# Patient Record
Sex: MALE | Race: WHITE | NOT HISPANIC OR LATINO | Employment: FULL TIME | URBAN - METROPOLITAN AREA
[De-identification: names, ages, dates, MRNs, and addresses within clinical notes are randomized per-mention and may not be internally consistent; named-entity substitution may affect disease eponyms.]

---

## 2021-12-08 ENCOUNTER — TELEPHONE (OUTPATIENT)
Dept: SURGERY | Facility: CLINIC | Age: 41
End: 2021-12-08

## 2021-12-08 NOTE — TELEPHONE ENCOUNTER
Called patient regarding appointment for 12/21/21 no answer. Appointment details left on VM with message to call back.

## 2021-12-09 ENCOUNTER — OFFICE VISIT (OUTPATIENT)
Dept: FAMILY MEDICINE CLINIC | Facility: CLINIC | Age: 41
End: 2021-12-09

## 2021-12-09 VITALS — WEIGHT: 189 LBS | HEIGHT: 70 IN | BODY MASS INDEX: 27.06 KG/M2

## 2021-12-09 DIAGNOSIS — S29.9XXA INJURY OF CHEST WALL, INITIAL ENCOUNTER: Primary | ICD-10-CM

## 2021-12-09 DIAGNOSIS — T14.90XA INJURY OF CARTILAGE: ICD-10-CM

## 2021-12-09 PROBLEM — E78.5 HYPERLIPIDEMIA: Status: ACTIVE | Noted: 2020-03-02

## 2021-12-09 PROBLEM — Z71.89 CARDIAC RISK COUNSELING: Status: ACTIVE | Noted: 2020-03-02

## 2021-12-09 PROBLEM — I10 BENIGN ESSENTIAL HYPERTENSION: Status: ACTIVE | Noted: 2020-03-02

## 2021-12-09 PROCEDURE — 99203 OFFICE O/P NEW LOW 30 MIN: CPT | Performed by: NURSE PRACTITIONER

## 2021-12-09 RX ORDER — HYDROCODONE BITARTRATE AND ACETAMINOPHEN 7.5; 325 MG/1; MG/1
1 TABLET ORAL EVERY 4 HOURS PRN
Qty: 46 TABLET | Refills: 0 | Status: SHIPPED | OUTPATIENT
Start: 2021-12-09 | End: 2021-12-21 | Stop reason: SDUPTHER

## 2021-12-09 RX ORDER — HYDROXYZINE HYDROCHLORIDE 10 MG/1
TABLET, FILM COATED ORAL
COMMUNITY
Start: 2021-10-04 | End: 2022-01-24

## 2021-12-09 RX ORDER — FINASTERIDE 1 MG/1
TABLET, FILM COATED ORAL
COMMUNITY
Start: 2021-10-25 | End: 2022-01-24

## 2021-12-14 ENCOUNTER — TELEPHONE (OUTPATIENT)
Dept: SURGERY | Facility: CLINIC | Age: 41
End: 2021-12-14

## 2021-12-14 NOTE — TELEPHONE ENCOUNTER
Called patient to confirm appointment for 12/21/21 no answer left message on vm with appointment time and details as well as requesting the patient to bring imaging to the appointment as well. Instructed to call [phone] with questions

## 2021-12-16 LAB — DRUGS UR: NORMAL

## 2021-12-21 ENCOUNTER — HOSPITAL ENCOUNTER (OUTPATIENT)
Dept: GENERAL RADIOLOGY | Facility: HOSPITAL | Age: 41
Discharge: HOME | End: 2021-12-21
Admitting: THORACIC SURGERY (CARDIOTHORACIC VASCULAR SURGERY)

## 2021-12-21 ENCOUNTER — PATIENT ROUNDING (BHMG ONLY) (OUTPATIENT)
Dept: SURGERY | Facility: CLINIC | Age: 41
End: 2021-12-21

## 2021-12-21 ENCOUNTER — OFFICE VISIT (OUTPATIENT)
Dept: SURGERY | Facility: CLINIC | Age: 41
End: 2021-12-21

## 2021-12-21 VITALS
OXYGEN SATURATION: 98 % | WEIGHT: 185 LBS | BODY MASS INDEX: 26.48 KG/M2 | SYSTOLIC BLOOD PRESSURE: 112 MMHG | DIASTOLIC BLOOD PRESSURE: 82 MMHG | HEART RATE: 70 BPM | HEIGHT: 70 IN

## 2021-12-21 DIAGNOSIS — T14.90XA INJURY OF CARTILAGE: ICD-10-CM

## 2021-12-21 DIAGNOSIS — S29.9XXA INJURY OF CHEST WALL, INITIAL ENCOUNTER: ICD-10-CM

## 2021-12-21 DIAGNOSIS — S29.9XXA CHEST WALL INJURY, INITIAL ENCOUNTER: ICD-10-CM

## 2021-12-21 DIAGNOSIS — R07.89 OTHER CHEST PAIN: Primary | ICD-10-CM

## 2021-12-21 DIAGNOSIS — S29.9XXA CHEST WALL INJURY, INITIAL ENCOUNTER: Primary | ICD-10-CM

## 2021-12-21 PROCEDURE — 71046 X-RAY EXAM CHEST 2 VIEWS: CPT

## 2021-12-21 PROCEDURE — 99202 OFFICE O/P NEW SF 15 MIN: CPT | Performed by: THORACIC SURGERY (CARDIOTHORACIC VASCULAR SURGERY)

## 2021-12-21 RX ORDER — CELECOXIB 100 MG/1
100 CAPSULE ORAL 2 TIMES DAILY
Qty: 60 CAPSULE | Refills: 0 | Status: SHIPPED | OUTPATIENT
Start: 2021-12-21 | End: 2022-01-20

## 2021-12-21 RX ORDER — GABAPENTIN 300 MG/1
300 CAPSULE ORAL 3 TIMES DAILY
Qty: 90 CAPSULE | Refills: 0 | Status: SHIPPED | OUTPATIENT
Start: 2021-12-21 | End: 2022-07-21

## 2021-12-21 RX ORDER — HYDROCODONE BITARTRATE AND ACETAMINOPHEN 7.5; 325 MG/1; MG/1
1 TABLET ORAL EVERY 4 HOURS PRN
Qty: 46 TABLET | Refills: 0 | Status: SHIPPED | OUTPATIENT
Start: 2021-12-21 | End: 2022-01-24

## 2021-12-21 NOTE — PROGRESS NOTES
"Chief Complaint  Injury to the chest wall with pain    Subjective          Herminio Schneider presents to [facility] THORACIC SURGERY  History of Present Illness     Mr. Schneider is a 41-year-old male .  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  While trying to break a hold he felt a pop in his chest near the junction of the left costal margin in sternum.  He stopped for a few minutes and felt better and then proceeded on with his training.  Unfortunately during the ensuing maneuvers the pain became even more severe and he could feel movement in his left chest.  Since then he has had severe pain.  He treated himself with Advil and a body brace.  This did not help his symptoms.  He was seen by immediate care and a chest x-ray was obtained which was within normal limits.  He has been referred here for further evaluation.    He smoked briefly for about 4 years and has not smoked since age 18.  He is on an inhaler for asthma.  He has hypertension.  He has no other medical problems    Objective   Vital Signs:   /82 (BP Location: Right arm, Patient Position: Sitting, Cuff Size: Adult)   Pulse 70   Ht 177.8 cm (70\")   Wt 83.9 kg (185 lb)   SpO2 98%   BMI 26.54 kg/m²     Physical Exam     General: Patient is clearly uncomfortable in pain and very guarding of his movements.  He is most comfortable when lying on his back with his left arm up over his head.    Neck: No cervical or supraclavicular adenopathy.  Full range of motion without pain.    Chest: Sternum is stable.  Very tender just left of the xiphoid near the costal margin.  Costal margin is stable.  No deformity and no step-off.  I cannot feel any movement in the chest wall with coughing or movement of his left arm.    Pulmonary: Lungs are clear to auscultation with equal breath sounds bilaterally    Cardiac: Regular rate and rhythm.  No murmur or gallop.  No dependent edema.    Result Review :   The following data was " reviewed by: Marcus Steen III, MD on 12/21/2021:    Chest x-ray performed today was independently reviewed.  Both lungs are fully expanded.  No pneumothorax.  No pleural effusion.  A few scattered calcified granulomas.  Heart size is within normal limits.  No obvious chest wall abnormality.         Assessment and Plan    Diagnoses and all orders for this visit:    1. Other chest pain (Primary)  -     CT Chest Without Contrast; Future  -     gabapentin (NEURONTIN) 300 MG capsule; Take 1 capsule by mouth 3 (Three) Times a Day for 30 days.  Dispense: 90 capsule; Refill: 0    2. Injury of chest wall, initial encounter  -     HYDROcodone-acetaminophen (Norco) 7.5-325 MG per tablet; Take 1 tablet by mouth Every 4 (Four) Hours As Needed for Moderate Pain .  Dispense: 46 tablet; Refill: 0    3. Injury of cartilage  -     HYDROcodone-acetaminophen (Norco) 7.5-325 MG per tablet; Take 1 tablet by mouth Every 4 (Four) Hours As Needed for Moderate Pain .  Dispense: 46 tablet; Refill: 0    Other orders  -     celecoxib (CeleBREX) 100 MG capsule; Take 1 capsule by mouth 2 (Two) Times a Day for 30 days.  Dispense: 60 capsule; Refill: 0      I believe that this gentleman dislocated costal cartilage along the left side of the sternum and possibly the xiphoid.  He is in quite a bit of pain.  I have ordered a CT of the chest wall with 3D reconstruction to get a better idea of the extent of his injury.  I have reordered his Lortab to help with pain control.  I have also started him on our rib fracture protocol with gabapentin and Celebrex.  Have advised him to rest and to use heat alternating with ice on the area.  He will return to the office in 3 weeks for further evaluation.  If no better we will try pain management for trigger point injections.    I spent 22 minutes caring for Herminio on this date of service. This time includes time spent by me in the following activities:preparing for the visit, reviewing tests, performing a  medically appropriate examination and/or evaluation , counseling and educating the patient/family/caregiver, ordering medications, tests, or procedures, referring and communicating with other health care professionals , documenting information in the medical record, independently interpreting results and communicating that information with the patient/family/caregiver and care coordination  Follow Up   Return in about 3 weeks (around 1/11/2022) for Recheck.  Patient was given instructions and counseling regarding his condition or for health maintenance advice. Please see specific information pulled into the AVS if appropriate.

## 2021-12-21 NOTE — PROGRESS NOTES
December 21, 2021    osmin Redd I speak with Herminio Schneider?        Done in office    Before we get started may I verify your date of birth? 1980       Tell me about your visit with us. What things went well?  Everything went good. Everything was explained well and in detail.        We're always looking for ways to make our patients' experiences even better. Do you have recommendations on ways we may improve?  no    Overall were you satisfied with your first visit to our practice? yes       I appreciate you taking the time to speak with me today. Is there anything else I can do for you? no    Thank you, and have a great day.

## 2021-12-27 ENCOUNTER — APPOINTMENT (OUTPATIENT)
Dept: CT IMAGING | Facility: HOSPITAL | Age: 41
End: 2021-12-27

## 2021-12-30 ENCOUNTER — HOSPITAL ENCOUNTER (OUTPATIENT)
Dept: CT IMAGING | Facility: HOSPITAL | Age: 41
Discharge: HOME | End: 2021-12-30
Admitting: THORACIC SURGERY (CARDIOTHORACIC VASCULAR SURGERY)

## 2021-12-30 DIAGNOSIS — R07.89 OTHER CHEST PAIN: ICD-10-CM

## 2021-12-30 PROCEDURE — 71250 CT THORAX DX C-: CPT

## 2022-01-11 ENCOUNTER — OFFICE VISIT (OUTPATIENT)
Dept: SURGERY | Facility: CLINIC | Age: 42
End: 2022-01-11

## 2022-01-11 VITALS
DIASTOLIC BLOOD PRESSURE: 82 MMHG | SYSTOLIC BLOOD PRESSURE: 130 MMHG | HEART RATE: 70 BPM | BODY MASS INDEX: 26.48 KG/M2 | WEIGHT: 185 LBS | OXYGEN SATURATION: 99 % | HEIGHT: 70 IN

## 2022-01-11 DIAGNOSIS — T14.90XA INJURY OF CARTILAGE: ICD-10-CM

## 2022-01-11 DIAGNOSIS — S29.9XXA CHEST WALL INJURY, INITIAL ENCOUNTER: Primary | ICD-10-CM

## 2022-01-11 PROCEDURE — 99213 OFFICE O/P EST LOW 20 MIN: CPT | Performed by: THORACIC SURGERY (CARDIOTHORACIC VASCULAR SURGERY)

## 2022-01-11 NOTE — PROGRESS NOTES
"Chief Complaint  Follow-up (3 wk ct follow up)    Subjective          Herminio Schneider presents to [facility] THORACIC SURGERY  History of Present Illness     Mr. Jaquez was seen in the office today January 11, 2022 for further follow-up of chest wall pain.  He has been taking gabapentin and Celebrex since his initial evaluation a month ago.  He has been resting and not working out.  He feels as though his pain is improved.  He believes the gabapentin makes the biggest difference.  He still has pain but is starting to do some stretching exercises and seems to be tolerating this well.  He has had a CT of the chest with 3D reconstruction of the chest wall for further evaluation.  He is here today to discuss the CT scan and further treatment.    Objective   Vital Signs:   /82 (BP Location: Right arm, Patient Position: Sitting, Cuff Size: Adult)   Pulse 70   Ht 177.8 cm (70\")   Wt 83.9 kg (185 lb)   SpO2 99%   BMI 26.54 kg/m²     Physical Exam     General: Patient is much more comfortable.  No guarding.    Neck: No cervical or supraclavicular adenopathy.  Full range of motion without pain.    Chest: Sternum is stable.  Marked decreased tenderness along the left xiphoid and costal margin.  No deformity no step-off.  No instability.    Pulmonary: Lungs are clear to auscultation.    Cardiac: Regular rate and rhythm.  No murmurs or gallops.  No dependent edema.    Result Review :   The following data was reviewed by: Marcus Steen III, MD on 01/11/2022:    CT scan of the chest performed December 30, 2021 was independently reviewed.  I see no evidence for fracture or dislocation of the xiphoid.  There is a nondisplaced fracture of the left sixth costal cartilage.  There is a displaced fracture of the left seventh costal cartilage.  Remainder of the ribs and costal cartilages are intact.  No pulmonary infiltrates.  There is a sub-6 mm nodule in the right upper lobe.  No suspicious hilar or " mediastinal adenopathy.         Assessment and Plan    Diagnoses and all orders for this visit:    1. Chest wall injury, initial encounter (Primary)  -     Ambulatory Referral to Physical Therapy Evaluate and treat    2. Injury of cartilage  -     Ambulatory Referral to Physical Therapy Evaluate and treat      Patient is improving with medication and rest.  I believe he is improved enough at this point that he may benefit from physical therapy.  We will refer him to sports medicine for physical therapy since he works as a  and is so active in his working out.  We will do the physical therapy for 6 weeks and then reevaluate for possible return to work.  We will also continue the gabapentin and Celebrex.  Will not need other medications at this time.    I spent 23 minutes caring for Herminio on this date of service. This time includes time spent by me in the following activities:preparing for the visit, reviewing tests, performing a medically appropriate examination and/or evaluation , counseling and educating the patient/family/caregiver, ordering medications, tests, or procedures, referring and communicating with other health care professionals , documenting information in the medical record, independently interpreting results and communicating that information with the patient/family/caregiver and care coordination  Follow Up   Return in about 6 weeks (around 2/22/2022) for Recheck.  Patient was given instructions and counseling regarding his condition or for health maintenance advice. Please see specific information pulled into the AVS if appropriate.

## 2022-01-13 ENCOUNTER — TREATMENT (OUTPATIENT)
Dept: PHYSICAL THERAPY | Facility: CLINIC | Age: 42
End: 2022-01-13

## 2022-01-13 DIAGNOSIS — T14.90XA INJURY OF CARTILAGE: ICD-10-CM

## 2022-01-13 DIAGNOSIS — S29.9XXA INJURY OF CHEST WALL, INITIAL ENCOUNTER: Primary | ICD-10-CM

## 2022-01-13 DIAGNOSIS — R68.89 DECREASED FUNCTIONAL ACTIVITY TOLERANCE: ICD-10-CM

## 2022-01-13 DIAGNOSIS — R07.89 COSTOCHONDRAL CHEST PAIN: ICD-10-CM

## 2022-01-13 PROCEDURE — 97110 THERAPEUTIC EXERCISES: CPT | Performed by: PHYSICAL THERAPIST

## 2022-01-13 PROCEDURE — 97162 PT EVAL MOD COMPLEX 30 MIN: CPT | Performed by: PHYSICAL THERAPIST

## 2022-01-13 NOTE — PROGRESS NOTES
Physical Therapy Initial Evaluation and Plan of Care    Patient: Herminio Schneider   : 1980  Diagnosis/ICD-10 Code:  Injury of chest wall, initial encounter [S29.9XXA]  Referring practitioner: Marcus Steen III, MD    Subjective Evaluation    History of Present Illness  Mechanism of injury: Pt reports on Dec 7 2021 he was in [employer] class and and felt a pop in chest wall   CT done on 21  IMPRESSION:  1.  Displaced fracture of the left seventh costochondral cartilage by  approximately one shaft width.  2.  Findings of mild pneumonia within the right lower lobe. The  tree-in-bud appearance is suggestive of a primary endobronchial based  pneumonia with differential considerations including but not limited to  aspiration versus atypical mycobacterium. Correlation with patient  history is recommended to establish the most appropriate etiology.  3.  Sub-6 mm pulmonary nodule right upper lobe. Follow-up with chest CT  in 12 months be considered to ensure stability.  4.  Other findings as above.      Patient Occupation:   Pain  Current pain ratin  At best pain ratin  At worst pain ratin  Location: L chest wall   Quality: tight, pulling and sharp  Relieving factors: medications  Aggravating factors: lifting, prolonged positioning, sleeping and outstretched reach (weight bearing )  Progression: improved    Hand dominance: left    Treatments  Previous treatment: medication  Current treatment: physical therapy  Patient Goals  Patient goals for therapy: decreased pain, increased motion, increased strength and return to sport/leisure activities             Objective          Palpation   Left   Tenderness of the intercostals.     Active Range of Motion   Left Shoulder   Normal active range of motion    Right Shoulder   Normal active range of motion    Strength/Myotome Testing     Left Shoulder     Planes of Motion   Flexion: 5   Abduction: 5   External rotation at 0°: 5   Internal rotation at  0°: 5     Isolated Muscles   Pectoralis minor: 4   Rhomboids: 4-   Serratus anterior: 4-   Supraspinatus: 4     Right Shoulder     Planes of Motion   Flexion: 5   Abduction: 5   External rotation at 0°: 5   Internal rotation at 0°: 5     Isolated Muscles   Pectoralis minor: 4+   Rhomboids: 4-   Serratus anterior: 4   Supraspinatus: 4+     Functional Assessment     Comments  Quick dash: 27.27          Assessment & Plan     Assessment  Impairments: abnormal or restricted ROM, activity intolerance, impaired physical strength, lacks appropriate home exercise program and pain with function  Functional Limitations: carrying objects, lifting, pulling, pushing, uncomfortable because of pain, reaching overhead and unable to perform repetitive tasks  Assessment details: Pt presents with signs/symptoms consistent with diagnosis.  Will benefit from PT to address impairments as above to allow return to normal daily activities.  Prognosis: good    Goals  Plan Goals: Short Term Goals: 2-4 weeks. Patient will:  1. Be independent with initial HEP  2. Be instructed in posture and body mechanics.  3. Pt will tolerate progression of exercises and weight bearing exercises without increase in pain.     Long Term Goals: 4-8 weeks. Pt will:  1. Demonstrate improved bilateral UE MMT of >/= 4+/5 to allow for performance of ADL's/household management/recreational activities.  2. Pt able to reach overhead and lift 10# to allow for return to doing home/yard/recreational/wok  activities with min to no pain.  3. Report perceived disability </=10% based on QuickDASH    Plan  Therapy options: will be seen for skilled therapy services  Planned modality interventions: cryotherapy, TENS, thermotherapy (hydrocollator packs) and ultrasound  Planned therapy interventions: manual therapy, postural training, body mechanics training, flexibility, functional ROM exercises, home exercise program, stretching, strengthening, soft tissue mobilization, joint  mobilization, therapeutic activities, abdominal trunk stabilization and neuromuscular re-education  Frequency: 2x week  Duration in weeks: 8  Treatment plan discussed with: patient        Manual Therapy:          mins  06561;  Therapeutic Exercise:     25     mins  53411;     Neuromuscular Suzie:         mins  08140;    Therapeutic Activity:           mins  43300;     Gait Training:            mins  12143;     Ultrasound:           mins  01426;    Electrical Stimulation:          mins  67266 ( );  Dry Needling           mins self-pay  Traction           mins 42978  Canalith Repositioning         mins [id number]      Timed Treatment:  25   mins   Total Treatment:     60   mins    PT SIGNATURE: Marita Barth PT   [place] Lic #[id number]    DATE TREATMENT INITIATED: 1/13/2022    Initial Certification  Certification Period: 4/13/2022  I certify that the therapy services are furnished while this patient is under my care.  The services outlined above are required by this patient, and will be reviewed every 90 days.     PHYSICIAN: Marcus Steen III, MD      DATE:     Please sign and return via fax to [phone].. Thank you, [facility].

## 2022-01-13 NOTE — PATIENT INSTRUCTIONS
Access Code: [email or web address]  URL: [email or web address]  Date: 01/13/2022  Prepared by: Marita Barth    Exercises  Supine Bilateral Shoulder Protraction - 1 x daily - 7 x weekly - 3 sets - 10 reps  Supine Scapular Protraction in Flexion with Dumbbells - 1 x daily - 7 x weekly - 3 sets - 10 reps  Scapular Protraction at Wall - 1 x daily - 7 x weekly - 3 sets - 10 reps  Supine 90/90 Overhead Dumbbell Raise - 1 x daily - 7 x weekly - 3 sets - 10 reps  Supine Alternating Shoulder Flexion - 1 x daily - 7 x weekly - 3 sets - 10 reps  Bird Dog with Knee Taps - 1 x daily - 7 x weekly - 3 sets - 10 reps  Bird Dog - 1 x daily - 7 x weekly - 3 sets - 10 reps

## 2022-01-24 ENCOUNTER — TELEPHONE (OUTPATIENT)
Dept: PHYSICAL THERAPY | Facility: CLINIC | Age: 42
End: 2022-01-24

## 2022-01-24 ENCOUNTER — OFFICE VISIT (OUTPATIENT)
Dept: FAMILY MEDICINE CLINIC | Facility: CLINIC | Age: 42
End: 2022-01-24

## 2022-01-24 ENCOUNTER — TRANSCRIBE ORDERS (OUTPATIENT)
Dept: ADMINISTRATIVE | Facility: HOSPITAL | Age: 42
End: 2022-01-24

## 2022-01-24 VITALS
DIASTOLIC BLOOD PRESSURE: 74 MMHG | OXYGEN SATURATION: 98 % | BODY MASS INDEX: 27.2 KG/M2 | HEIGHT: 70 IN | HEART RATE: 68 BPM | TEMPERATURE: 96.7 F | WEIGHT: 190 LBS | RESPIRATION RATE: 20 BRPM | SYSTOLIC BLOOD PRESSURE: 106 MMHG

## 2022-01-24 DIAGNOSIS — Z13.6 ENCOUNTER FOR SCREENING FOR VASCULAR DISEASE: Primary | ICD-10-CM

## 2022-01-24 DIAGNOSIS — Z11.59 NEED FOR HEPATITIS C SCREENING TEST: ICD-10-CM

## 2022-01-24 DIAGNOSIS — Z23 NEED FOR INFLUENZA VACCINATION: ICD-10-CM

## 2022-01-24 DIAGNOSIS — Z13.220 SCREENING FOR HYPERLIPIDEMIA: ICD-10-CM

## 2022-01-24 DIAGNOSIS — E55.9 VITAMIN D DEFICIENCY: ICD-10-CM

## 2022-01-24 DIAGNOSIS — Z13.0 SCREENING FOR DEFICIENCY ANEMIA: ICD-10-CM

## 2022-01-24 DIAGNOSIS — Z00.00 ROUTINE ADULT HEALTH MAINTENANCE: Primary | ICD-10-CM

## 2022-01-24 PROCEDURE — 90471 IMMUNIZATION ADMIN: CPT | Performed by: NURSE PRACTITIONER

## 2022-01-24 PROCEDURE — 99396 PREV VISIT EST AGE 40-64: CPT | Performed by: NURSE PRACTITIONER

## 2022-01-24 PROCEDURE — 90686 IIV4 VACC NO PRSV 0.5 ML IM: CPT | Performed by: NURSE PRACTITIONER

## 2022-01-24 RX ORDER — TRAMADOL HYDROCHLORIDE 50 MG/1
TABLET ORAL
COMMUNITY
Start: 2021-12-21 | End: 2022-01-24

## 2022-01-24 RX ORDER — TRANEXAMIC ACID 650 MG/1
TABLET ORAL
COMMUNITY
Start: 2021-12-22 | End: 2022-01-24

## 2022-01-24 RX ORDER — PROMETHAZINE HYDROCHLORIDE 25 MG/1
TABLET ORAL
COMMUNITY
Start: 2021-12-21 | End: 2022-01-24

## 2022-01-24 NOTE — PROGRESS NOTES
"Chief Complaint  Chest Pain (doing better) and Med Refill (fasting for lab )    Subjective          Herminio Schneider presents to [facility] PRIMARY CARE  History of Present Illness  This is a 41-year-old male patient here today for annual physical.  He has recently been followed by Dr. Steen for chest wall injury.  CT scan shows displaced fracture of the left seventh costal cartilage.  He is currently taking gabapentin for pain.  He is now in physical therapy and doing well.  He has a history of hypertension and hyperlipidemia that is controlled with Lipitor and lisinopril.  He denies any chest pain or shortness of breath.  Blood pressure stable today.  He has a significant family history of cardiovascular disease and has been seen by cardiologist in the past.  This patient is very active.  He is following a heart healthy diet.  He takes vitamin D daily for vitamin D deficiency.  He is also on omeprazole for GERD.  He does appreciate his coffee but is avoiding trigger foods and will attempt to wean himself on omeprazole.  CT scan did show a 6 mm right upper lobe nodule.  He denies any respiratory concerns today.        Objective   Vital Signs:   /74   Pulse 68   Temp 96.7 °F (35.9 °C)   Resp 20   Ht 177 cm (69.69\")   Wt 86.2 kg (190 lb)   SpO2 98%   BMI 27.51 kg/m²     Physical Exam  Vitals and nursing note reviewed.   Constitutional:       Appearance: Normal appearance.   HENT:      Head: Normocephalic.      Right Ear: Tympanic membrane normal.      Left Ear: Tympanic membrane normal.      Nose: Nose normal.      Mouth/Throat:      Mouth: Mucous membranes are moist.   Eyes:      Pupils: Pupils are equal, round, and reactive to light.   Cardiovascular:      Rate and Rhythm: Normal rate and regular rhythm.      Pulses: Normal pulses.      Heart sounds: Normal heart sounds.   Pulmonary:      Effort: Pulmonary effort is normal. No respiratory distress.      Breath sounds: Normal breath " sounds. No wheezing or rales.   Abdominal:      General: Bowel sounds are normal. There is no distension.      Palpations: There is no mass.      Tenderness: There is no abdominal tenderness.   Musculoskeletal:         General: No swelling or tenderness.      Cervical back: Normal range of motion and neck supple.      Right lower leg: No edema.      Left lower leg: No edema.   Feet:      Comments:      Skin:     General: Skin is warm and dry.   Neurological:      Mental Status: He is alert and oriented to person, place, and time.   Psychiatric:         Mood and Affect: Mood normal.         Behavior: Behavior normal.        Result Review :                 Assessment and Plan    Diagnoses and all orders for this visit:    1. Routine adult health maintenance (Primary)    2. Need for influenza vaccination  -     FluLaval/Fluarix/Fluzone >6 Months    3. Need for hepatitis C screening test    4. Screening for hyperlipidemia  -     Comprehensive Metabolic Panel  -     Lipid Panel  -     Hepatitis C Antibody    5. Screening for deficiency anemia  -     CBC & Differential    6. Vitamin D deficiency  -     Vitamin D 25 Hydroxy         We will obtain fasting lab work today.  Patient will continue his current regimen with attempt to taper omeprazole as tolerated.  We did discuss his CT scan results and we will do a follow-up CT in December this year.  Due to his family history of cardiovascular disease we did discuss cardiovascular screening and he will look at doing that.  Follow Up   Return in about 6 months (around 7/24/2022).  Patient was given instructions and counseling regarding his condition or for health maintenance advice. Please see specific information pulled into the AVS if appropriate.

## 2022-01-25 LAB
25(OH)D3+25(OH)D2 SERPL-MCNC: 63.8 NG/ML (ref 30–100)
ALBUMIN SERPL-MCNC: 4.3 G/DL (ref 4–5)
ALBUMIN/GLOB SERPL: 2.3 {RATIO} (ref 1.2–2.2)
ALP SERPL-CCNC: 52 IU/L (ref 44–121)
ALT SERPL-CCNC: 21 IU/L (ref 0–44)
AST SERPL-CCNC: 17 IU/L (ref 0–40)
BASOPHILS # BLD AUTO: 0.1 X10E3/UL (ref 0–0.2)
BASOPHILS NFR BLD AUTO: 1 %
BILIRUB SERPL-MCNC: 0.4 MG/DL (ref 0–1.2)
BUN SERPL-MCNC: 29 MG/DL (ref 6–24)
BUN/CREAT SERPL: 23 (ref 9–20)
CALCIUM SERPL-MCNC: 9.3 MG/DL (ref 8.7–10.2)
CHLORIDE SERPL-SCNC: 101 MMOL/L (ref 96–106)
CHOLEST SERPL-MCNC: 171 MG/DL (ref 100–199)
CO2 SERPL-SCNC: 25 MMOL/L (ref 20–29)
CREAT SERPL-MCNC: 1.28 MG/DL (ref 0.76–1.27)
EOSINOPHIL # BLD AUTO: 0.2 X10E3/UL (ref 0–0.4)
EOSINOPHIL NFR BLD AUTO: 3 %
ERYTHROCYTE [DISTWIDTH] IN BLOOD BY AUTOMATED COUNT: 12.4 % (ref 11.6–15.4)
GLOBULIN SER CALC-MCNC: 1.9 G/DL (ref 1.5–4.5)
GLUCOSE SERPL-MCNC: 97 MG/DL (ref 65–99)
HCT VFR BLD AUTO: 42.7 % (ref 37.5–51)
HCV AB S/CO SERPL IA: <0.1 S/CO RATIO (ref 0–0.9)
HDLC SERPL-MCNC: 55 MG/DL
HGB BLD-MCNC: 14.1 G/DL (ref 13–17.7)
IMM GRANULOCYTES # BLD AUTO: 0 X10E3/UL (ref 0–0.1)
IMM GRANULOCYTES NFR BLD AUTO: 0 %
LDLC SERPL CALC-MCNC: 102 MG/DL (ref 0–99)
LYMPHOCYTES # BLD AUTO: 1.6 X10E3/UL (ref 0.7–3.1)
LYMPHOCYTES NFR BLD AUTO: 25 %
MCH RBC QN AUTO: 30.9 PG (ref 26.6–33)
MCHC RBC AUTO-ENTMCNC: 33 G/DL (ref 31.5–35.7)
MCV RBC AUTO: 93 FL (ref 79–97)
MONOCYTES # BLD AUTO: 0.8 X10E3/UL (ref 0.1–0.9)
MONOCYTES NFR BLD AUTO: 13 %
NEUTROPHILS # BLD AUTO: 3.8 X10E3/UL (ref 1.4–7)
NEUTROPHILS NFR BLD AUTO: 58 %
PLATELET # BLD AUTO: 212 X10E3/UL (ref 150–450)
POTASSIUM SERPL-SCNC: 4.5 MMOL/L (ref 3.5–5.2)
PROT SERPL-MCNC: 6.2 G/DL (ref 6–8.5)
RBC # BLD AUTO: 4.57 X10E6/UL (ref 4.14–5.8)
SODIUM SERPL-SCNC: 138 MMOL/L (ref 134–144)
TRIGL SERPL-MCNC: 73 MG/DL (ref 0–149)
VLDLC SERPL CALC-MCNC: 14 MG/DL (ref 5–40)
WBC # BLD AUTO: 6.4 X10E3/UL (ref 3.4–10.8)

## 2022-01-26 ENCOUNTER — TREATMENT (OUTPATIENT)
Dept: PHYSICAL THERAPY | Facility: CLINIC | Age: 42
End: 2022-01-26

## 2022-01-26 DIAGNOSIS — R68.89 DECREASED FUNCTIONAL ACTIVITY TOLERANCE: ICD-10-CM

## 2022-01-26 DIAGNOSIS — R07.89 COSTOCHONDRAL CHEST PAIN: ICD-10-CM

## 2022-01-26 DIAGNOSIS — S29.9XXA INJURY OF CHEST WALL, INITIAL ENCOUNTER: Primary | ICD-10-CM

## 2022-01-26 DIAGNOSIS — T14.90XA INJURY OF CARTILAGE: ICD-10-CM

## 2022-01-26 PROCEDURE — 97110 THERAPEUTIC EXERCISES: CPT | Performed by: PHYSICAL THERAPIST

## 2022-01-26 NOTE — PROGRESS NOTES
Physical Therapy Daily Progress Note  Visit: 2    Subjective Herminio Schneider reports: compliance with HEP     Objective   See Exercise, Manual, and Modality Logs for complete treatment. HEP review, answered questions in regards to POC, reviewed pull up exercises at home   Access Code: [email or web address]  URL: [email or web address]  Date: 01/26/2022  Prepared by: Marita Barth    Exercises  Supine Bilateral Shoulder Protraction - 1 x daily - 7 x weekly - 3 sets - 10 reps  Supine Scapular Protraction in Flexion with Dumbbells - 1 x daily - 7 x weekly - 3 sets - 10 reps  Scapular Protraction at Wall - 1 x daily - 7 x weekly - 3 sets - 10 reps  Supine 90/90 Overhead Dumbbell Raise - 1 x daily - 7 x weekly - 3 sets - 10 reps  Supine Alternating Shoulder Flexion - 1 x daily - 7 x weekly - 3 sets - 10 reps  Bird Dog with Knee Taps - 1 x daily - 7 x weekly - 3 sets - 10 reps  Bird Dog - 1 x daily - 7 x weekly - 3 sets - 10 reps  Supine 90/90 with Leg Extensions - 1 x daily - 7 x weekly - 3 sets - 10 reps    Assessment/Plan:  Weak upper and lower abdominals. Compliant/cooperative with current rehab efforts.  Benefits from verbal/tactile cues to ensure correct exercise performance/technique, hold time and position. Plan details: Progress ROM / strengthening / stabilization / functional activity as tolerated     Manual Therapy:          mins  98139;  Therapeutic Exercise:      40    mins  25739;     Neuromuscular Suzie:         mins  24587;    Therapeutic Activity:           mins  69079;     Gait Training:            mins  08869;     Ultrasound:           mins  04603;    Electrical Stimulation:          mins  60197 ( );  Dry Needling           mins self-pay  Traction           mins 61771  Canalith Repositioning         mins 49097      Timed Treatment:  40    mins   Total Treatment:     40   mins    Marita Barth, PT  [place] License #: [id number]    Physical Therapist

## 2022-02-10 ENCOUNTER — TREATMENT (OUTPATIENT)
Dept: PHYSICAL THERAPY | Facility: CLINIC | Age: 42
End: 2022-02-10

## 2022-02-10 DIAGNOSIS — R68.89 DECREASED FUNCTIONAL ACTIVITY TOLERANCE: ICD-10-CM

## 2022-02-10 DIAGNOSIS — T14.90XA INJURY OF CARTILAGE: ICD-10-CM

## 2022-02-10 DIAGNOSIS — R07.89 COSTOCHONDRAL CHEST PAIN: ICD-10-CM

## 2022-02-10 DIAGNOSIS — S29.9XXA INJURY OF CHEST WALL, INITIAL ENCOUNTER: Primary | ICD-10-CM

## 2022-02-10 PROCEDURE — 97110 THERAPEUTIC EXERCISES: CPT | Performed by: PHYSICAL THERAPIST

## 2022-02-10 NOTE — PROGRESS NOTES
Physical Therapy Daily Progress Note  Visit: 3    Subjective Herminio Schneider reports: Compliance with HEP and no pain, just mild discomfort occasionally     Objective   See Exercise, Manual, and Modality Logs for complete treatment.  Reviewed HEP and added sidelying open book and quadruped thoracic rotation. Issued updated HEP     Assessment/Plan:Progressing well and compliant with HEP. Plan details: Progress ROM / strengthening / stabilization / functional activity as tolerated     Manual Therapy:          mins  79361;  Therapeutic Exercise:     30     mins  48196;     Neuromuscular Suzie:         mins  72884;    Therapeutic Activity:           mins  99790;     Gait Training:            mins  57200;     Ultrasound:           mins  51062;    Electrical Stimulation:          mins  57349 ( );  Dry Needling           mins self-pay  Traction           mins 75444  Canalith Repositioning         mins 50987      Timed Treatment:  30    mins   Total Treatment:     30   mins    Marita Barth PT  [place] License #: [id number]    Physical Therapist

## 2022-02-28 ENCOUNTER — TREATMENT (OUTPATIENT)
Dept: PHYSICAL THERAPY | Facility: CLINIC | Age: 42
End: 2022-02-28

## 2022-02-28 DIAGNOSIS — S29.9XXA INJURY OF CHEST WALL, INITIAL ENCOUNTER: Primary | ICD-10-CM

## 2022-02-28 DIAGNOSIS — T14.90XA INJURY OF CARTILAGE: ICD-10-CM

## 2022-02-28 DIAGNOSIS — R68.89 DECREASED FUNCTIONAL ACTIVITY TOLERANCE: ICD-10-CM

## 2022-02-28 DIAGNOSIS — R07.89 COSTOCHONDRAL CHEST PAIN: ICD-10-CM

## 2022-02-28 PROCEDURE — 97110 THERAPEUTIC EXERCISES: CPT | Performed by: PHYSICAL THERAPIST

## 2022-02-28 NOTE — PROGRESS NOTES
Physical Therapy Daily Progress Note  Visit: 4    Subjective Herminio Schneider reports:  Compliance with HEP and just occasional soreness at area where fracture occurred. Has follow up with  on 3/2/22    Objective   See Exercise, Manual, and Modality Logs for complete treatment. Reviewed HEP  Added foam rolling to L rib area   Assessment/Plan: Good tolerance to exercises and to foam roll exercise. Plan details: Progress ROM / strengthening / stabilization / functional activity as tolerated     Manual Therapy:          mins  55321;  Therapeutic Exercise:     25     mins  03587;     Neuromuscular Suzie:         mins  47088;    Therapeutic Activity:           mins  93155;     Gait Training:            mins  47170;     Ultrasound:           mins  34848;    Electrical Stimulation:          mins  02062 ( );  Dry Needling           mins self-pay  Traction           mins 87013  Canalith Repositioning         mins 17373      Timed Treatment:   25   mins   Total Treatment:     25   mins    Marita Barth, PT  [place] License #: [id number]    Physical Therapist

## 2022-03-02 ENCOUNTER — OFFICE VISIT (OUTPATIENT)
Dept: SURGERY | Facility: CLINIC | Age: 42
End: 2022-03-02

## 2022-03-02 VITALS
OXYGEN SATURATION: 98 % | HEART RATE: 72 BPM | BODY MASS INDEX: 27.2 KG/M2 | WEIGHT: 190 LBS | HEIGHT: 70 IN | DIASTOLIC BLOOD PRESSURE: 80 MMHG | SYSTOLIC BLOOD PRESSURE: 122 MMHG

## 2022-03-02 DIAGNOSIS — Z87.828: ICD-10-CM

## 2022-03-02 DIAGNOSIS — T14.90XA INJURY OF CARTILAGE: Primary | ICD-10-CM

## 2022-03-02 PROCEDURE — 99213 OFFICE O/P EST LOW 20 MIN: CPT | Performed by: NURSE PRACTITIONER

## 2022-03-02 NOTE — PROGRESS NOTES
"Chief Complaint  Chest wall pain, follow-up    Subjective          Herminio Schneider presents to [facility] for continued follow-up.  Patient is new to me today.    History of Present Illness     Timoteo is a pleasant 81-year-old gentleman who suffered a chest wall injury with fracture of the left seventh costal cartilage while performing [employer] training in early December.  He has slowly made a good recovery.  He was initially prescribed gabapentin and Celebrex which helped with his pain.  He recently physical therapy about 6 weeks ago and reports significant improvement since then.  He has only minimal twinges of pain in his left chest with twisting.  It feels more like a pulling sensation than a sharp pain.  His physical therapist has initiated therapy with a foam roller to help with this discomfort and it seems to be improving.  He is eager to return to work.    Objective   Vital Signs:   /80 (BP Location: Right arm, Patient Position: Sitting, Cuff Size: Adult)   Pulse 72   Ht 177 cm (69.69\")   Wt 86.2 kg (190 lb)   SpO2 98%   BMI 27.51 kg/m²     Physical Exam  Vitals and nursing note reviewed.   Constitutional:       Appearance: Normal appearance.   HENT:      Head: Atraumatic.   Cardiovascular:      Rate and Rhythm: Normal rate and regular rhythm.      Pulses: Normal pulses.      Heart sounds: Normal heart sounds.   Pulmonary:      Effort: Pulmonary effort is normal.      Breath sounds: Normal breath sounds. No wheezing, rhonchi or rales.   Chest:      Chest wall: No tenderness.   Abdominal:      General: Bowel sounds are normal.      Palpations: Abdomen is soft.   Musculoskeletal:         General: Normal range of motion.   Skin:     General: Skin is warm and dry.   Neurological:      General: No focal deficit present.      Mental Status: He is alert and oriented to person, place, and time. Mental status is at baseline.        Result Review :   The following data was " reviewed by: BESSY Negrete on 03/02/2022:    Data reviewed: Radiologic studies CT of the chest performed without contrast December 2021 was independently reviewed.  There is a displaced fracture of the left seventh costochondral cartilage.  Mild pneumonia of the right lower lobe of the lung.  A sub-6 mm nodule of the right upper lobe is present.  It appears to be noncalcified.     Assessment and Plan    Diagnoses and all orders for this visit:    1. Injury of cartilage (Primary)    2. H/O chest wall injury    The patient appears to have made a good recovery from his injury.  He remaining discomfort he is feeling his chest is likely a result of some adhesions secondary to his injury.  I agree with the foam roller therapy and also recommended he had heat when needed.  He is free to return to his job with no restrictions.    Concerning the pulmonary nodule, patient is low risk for malignancy given the size of the nodule in his negative history of tobacco use.  I would recommend a follow-up CT of the chest without contrast in 1 year.  The patient reports that he is already discussed this with his primary care provider who can order this imaging for him.  We are happy to see him on an as-needed basis.  Thank you for allowing us to participate in the care of Timoteo Schneider.    I spent 28 minutes caring for Herminio on this date of service. This time includes time spent by me in the following activities:preparing for the visit, reviewing tests, obtaining and/or reviewing a separately obtained history, performing a medically appropriate examination and/or evaluation , counseling and educating the patient/family/caregiver, ordering medications, tests, or procedures, referring and communicating with other health care professionals , documenting information in the medical record, independently interpreting results and communicating that information with the patient/family/caregiver and care coordination  Follow Up   Return if  symptoms worsen or fail to improve.  Patient was given instructions and counseling regarding his condition or for health maintenance advice. Please see specific information pulled into the AVS if appropriate.

## 2022-04-08 NOTE — TELEPHONE ENCOUNTER
Caller: Herminio Schneider    Relationship: Self    Best call back number: [phone] (H)    Requested Prescriptions:   Requested Prescriptions     Pending Prescriptions Disp Refills   • atorvastatin (LIPITOR) 40 MG tablet       Sig: Take 1 tablet by mouth Daily.        Pharmacy where request should be sent: [facility] - [address] - [phone] PH - [phone] FX     Additional details provided by patient: PATIENT HAS BEEN WITHOUT MEDICATION FOR ABOUT 7-10 DAYS AND NEEDS REFILLED ASAP    Does the patient have less than a 3 day supply:  [x] Yes  [] No    Ramya Zaragoza Rep   04/08/22 15:58 EDT

## 2022-04-12 RX ORDER — ATORVASTATIN CALCIUM 40 MG/1
40 TABLET, FILM COATED ORAL DAILY
Qty: 90 TABLET | Refills: 1 | Status: SHIPPED | OUTPATIENT
Start: 2022-04-12 | End: 2022-07-21 | Stop reason: SDUPTHER

## 2022-04-12 NOTE — TELEPHONE ENCOUNTER
Last ov 1/24/22  Return in about 6 months (around 7/24/2022).  Patient was given instructions and counseling regarding his condition or for health maintenance advice. Please see specific information pulled into the AVS if appropriate.     Last lab 1/24/22    Labs have been reviewed and are ok. LDL is slightly elevated but not warrantling medication at this time. Limit high fatty foods and exercise if can tolerate.

## 2022-07-15 ENCOUNTER — HOSPITAL ENCOUNTER (OUTPATIENT)
Dept: CARDIOLOGY | Facility: HOSPITAL | Age: 42
Discharge: HOME | End: 2022-07-15
Admitting: NURSE PRACTITIONER

## 2022-07-15 VITALS
BODY MASS INDEX: 25.77 KG/M2 | WEIGHT: 180 LBS | HEART RATE: 73 BPM | SYSTOLIC BLOOD PRESSURE: 127 MMHG | HEIGHT: 70 IN | DIASTOLIC BLOOD PRESSURE: 62 MMHG

## 2022-07-15 DIAGNOSIS — Z13.6 ENCOUNTER FOR SCREENING FOR VASCULAR DISEASE: ICD-10-CM

## 2022-07-15 LAB
BH CV ECHO MEAS - DIST AO DIAM: 1.48 CM
BH CV VAS BP LEFT ARM: NORMAL MMHG
BH CV VAS BP RIGHT ARM: NORMAL MMHG
BH CV XLRA MEAS - MID AO DIAM: 1.61 CM
BH CV XLRA MEAS - PAD LEFT ABI DP: 1.02
BH CV XLRA MEAS - PAD LEFT ABI PT: 1.09
BH CV XLRA MEAS - PAD LEFT ARM: 127 MMHG
BH CV XLRA MEAS - PAD LEFT LEG DP: 130 MMHG
BH CV XLRA MEAS - PAD LEFT LEG PT: 139 MMHG
BH CV XLRA MEAS - PAD RIGHT ABI DP: 1.02
BH CV XLRA MEAS - PAD RIGHT ABI PT: 1.08
BH CV XLRA MEAS - PAD RIGHT ARM: 122 MMHG
BH CV XLRA MEAS - PAD RIGHT LEG DP: 130 MMHG
BH CV XLRA MEAS - PAD RIGHT LEG PT: 137 MMHG
BH CV XLRA MEAS - PROX AO DIAM: 1.61 CM
BH CV XLRA MEAS LEFT ICA/CCA RATIO: 0.66
BH CV XLRA MEAS LEFT MID CCA PSV: NORMAL CM/SEC
BH CV XLRA MEAS LEFT MID ICA PSV: NORMAL CM/SEC
BH CV XLRA MEAS LEFT PROX ECA PSV: NORMAL CM/SEC
BH CV XLRA MEAS RIGHT ICA/CCA RATIO: 0.67
BH CV XLRA MEAS RIGHT MID CCA PSV: NORMAL CM/SEC
BH CV XLRA MEAS RIGHT MID ICA PSV: NORMAL CM/SEC
BH CV XLRA MEAS RIGHT PROX ECA PSV: NORMAL CM/SEC
MAXIMAL PREDICTED HEART RATE: 177 BPM
STRESS TARGET HR: 150 BPM

## 2022-07-15 PROCEDURE — 93799 UNLISTED CV SVC/PROCEDURE: CPT

## 2022-07-21 ENCOUNTER — OFFICE VISIT (OUTPATIENT)
Dept: FAMILY MEDICINE CLINIC | Facility: CLINIC | Age: 42
End: 2022-07-21

## 2022-07-21 VITALS
WEIGHT: 186 LBS | HEART RATE: 79 BPM | OXYGEN SATURATION: 98 % | TEMPERATURE: 97.7 F | SYSTOLIC BLOOD PRESSURE: 120 MMHG | HEIGHT: 70 IN | DIASTOLIC BLOOD PRESSURE: 82 MMHG | BODY MASS INDEX: 26.63 KG/M2

## 2022-07-21 DIAGNOSIS — I10 BENIGN ESSENTIAL HYPERTENSION: Primary | ICD-10-CM

## 2022-07-21 DIAGNOSIS — E55.9 VITAMIN D DEFICIENCY: ICD-10-CM

## 2022-07-21 DIAGNOSIS — Z13.0 SCREENING FOR DEFICIENCY ANEMIA: ICD-10-CM

## 2022-07-21 DIAGNOSIS — E78.2 MIXED HYPERLIPIDEMIA: ICD-10-CM

## 2022-07-21 PROCEDURE — 99214 OFFICE O/P EST MOD 30 MIN: CPT | Performed by: NURSE PRACTITIONER

## 2022-07-21 RX ORDER — FINASTERIDE 1 MG/1
1 TABLET, FILM COATED ORAL
COMMUNITY
Start: 2022-07-17

## 2022-07-21 RX ORDER — ONDANSETRON 4 MG/1
TABLET, ORALLY DISINTEGRATING ORAL
COMMUNITY
Start: 2022-07-17 | End: 2022-10-21

## 2022-07-21 RX ORDER — LISINOPRIL 20 MG/1
20 TABLET ORAL DAILY
Qty: 90 TABLET | Refills: 1 | Status: SHIPPED | OUTPATIENT
Start: 2022-07-21 | End: 2022-12-29

## 2022-07-21 RX ORDER — ATORVASTATIN CALCIUM 40 MG/1
40 TABLET, FILM COATED ORAL DAILY
Qty: 90 TABLET | Refills: 1 | Status: SHIPPED | OUTPATIENT
Start: 2022-07-21

## 2022-07-21 RX ORDER — OMEPRAZOLE 20 MG/1
20 CAPSULE, DELAYED RELEASE ORAL DAILY
Qty: 90 CAPSULE | Refills: 1 | Status: SHIPPED | OUTPATIENT
Start: 2022-07-21 | End: 2023-01-13

## 2022-07-21 RX ORDER — TADALAFIL 5 MG/1
5 TABLET ORAL DAILY
COMMUNITY
Start: 2022-06-20

## 2022-07-21 NOTE — PROGRESS NOTES
"Chief Complaint  Follow-up (Patient is here for a hospital follow up. )    Subjective        Herminio Schneider presents to [facility]  History of Present Illness  This is a 41-year-old male patient here today to follow-up on hypertension hyperlipidemia and GERD.  Patient is doing well.  Blood pressure stable on lisinopril.  He denies any chest pain or shortness of breath.  He takes Lipitor for hyperlipidemia.  He is working on a low-fat diet and exercise.  He recently had vascular screening which showed mild plaque in left artery.  He denies side effects from statin.  He does report having a GI virus last week where he did go to the ER where he had elevated WBC and creatinine.  He denies any fever chills abdominal pain or nausea vomiting today.  He is currently taking Prilosec for GERD.  He takes vitamin D daily for vitamin D deficiency.    Objective   Vital Signs:  /82 (BP Location: Left arm, Patient Position: Sitting, Cuff Size: Adult)   Pulse 79   Temp 97.7 °F (36.5 °C) (Infrared)   Ht 177.8 cm (70\")   Wt 84.4 kg (186 lb)   SpO2 98%   BMI 26.69 kg/m²   Estimated body mass index is 26.69 kg/m² as calculated from the following:    Height as of this encounter: 177.8 cm (70\").    Weight as of this encounter: 84.4 kg (186 lb).          Physical Exam  Vitals and nursing note reviewed.   HENT:      Head: Normocephalic.      Nose: Nose normal.   Eyes:      Pupils: Pupils are equal, round, and reactive to light.   Cardiovascular:      Rate and Rhythm: Normal rate and regular rhythm.      Pulses: Normal pulses.      Heart sounds: Normal heart sounds.   Pulmonary:      Effort: Pulmonary effort is normal. No respiratory distress.      Breath sounds: Normal breath sounds. No wheezing or rales.   Abdominal:      General: Bowel sounds are normal. There is no distension.      Tenderness: There is no abdominal tenderness.   Musculoskeletal:         General: No swelling.      Cervical back: " Neck supple.      Right lower leg: No edema.      Left lower leg: No edema.   Skin:     General: Skin is warm and dry.   Neurological:      Mental Status: He is alert and oriented to person, place, and time.   Psychiatric:         Mood and Affect: Mood normal.        Result Review :                Assessment and Plan   Diagnoses and all orders for this visit:    1. Benign essential hypertension (Primary)  -     Urinalysis With Microscopic - Urine, Clean Catch  -     lisinopril (PRINIVIL,ZESTRIL) 20 MG tablet; Take 1 tablet by mouth Daily.  Dispense: 90 tablet; Refill: 1    2. Screening for deficiency anemia  -     CBC & Differential    3. Mixed hyperlipidemia  -     Comprehensive Metabolic Panel  -     Lipid Panel    4. Vitamin D deficiency  -     Vitamin D 25 Hydroxy    Other orders  -     atorvastatin (LIPITOR) 40 MG tablet; Take 1 tablet by mouth Daily.  Dispense: 90 tablet; Refill: 1  -     omeprazole (priLOSEC) 20 MG capsule; Take 1 capsule by mouth Daily.  Dispense: 90 capsule; Refill: 1  -     Microscopic Examination -  -     Vitamin D 25 Hydroxy    We will obtain lab work today.  He will continue his current regimen.  Of asked him return in 6 months or sooner if needed.    I spent a total of 30 minutes with the patient today including face-to-face encounter, reviewing data in the system, coordination of care with the nursing staff as well as consultants, documentation and entering orders.             Follow Up   No follow-ups on file.  Patient was given instructions and counseling regarding his condition or for health maintenance advice. Please see specific information pulled into the AVS if appropriate.

## 2022-07-22 LAB
25(OH)D3+25(OH)D2 SERPL-MCNC: 80 NG/ML (ref 30–100)
ALBUMIN SERPL-MCNC: 4.5 G/DL (ref 4–5)
ALBUMIN/GLOB SERPL: 2 {RATIO} (ref 1.2–2.2)
ALP SERPL-CCNC: 54 IU/L (ref 44–121)
ALT SERPL-CCNC: 25 IU/L (ref 0–44)
APPEARANCE UR: CLEAR
AST SERPL-CCNC: 21 IU/L (ref 0–40)
BACTERIA #/AREA URNS HPF: NORMAL /[HPF]
BASOPHILS # BLD AUTO: 0.1 X10E3/UL (ref 0–0.2)
BASOPHILS NFR BLD AUTO: 1 %
BILIRUB SERPL-MCNC: 0.6 MG/DL (ref 0–1.2)
BILIRUB UR QL STRIP: NEGATIVE
BUN SERPL-MCNC: 17 MG/DL (ref 6–24)
BUN/CREAT SERPL: 13 (ref 9–20)
CALCIUM SERPL-MCNC: 9.8 MG/DL (ref 8.7–10.2)
CASTS URNS QL MICRO: NORMAL /LPF
CHLORIDE SERPL-SCNC: 100 MMOL/L (ref 96–106)
CHOLEST SERPL-MCNC: 132 MG/DL (ref 100–199)
CO2 SERPL-SCNC: 27 MMOL/L (ref 20–29)
COLOR UR: YELLOW
CREAT SERPL-MCNC: 1.28 MG/DL (ref 0.76–1.27)
EGFRCR SERPLBLD CKD-EPI 2021: 71 ML/MIN/1.73
EOSINOPHIL # BLD AUTO: 0.1 X10E3/UL (ref 0–0.4)
EOSINOPHIL NFR BLD AUTO: 1 %
EPI CELLS #/AREA URNS HPF: NORMAL /HPF (ref 0–10)
ERYTHROCYTE [DISTWIDTH] IN BLOOD BY AUTOMATED COUNT: 11.4 % (ref 11.6–15.4)
GLOBULIN SER CALC-MCNC: 2.3 G/DL (ref 1.5–4.5)
GLUCOSE SERPL-MCNC: 89 MG/DL (ref 65–99)
GLUCOSE UR QL STRIP: NEGATIVE
HCT VFR BLD AUTO: 46.6 % (ref 37.5–51)
HDLC SERPL-MCNC: 49 MG/DL
HGB BLD-MCNC: 15.5 G/DL (ref 13–17.7)
HGB UR QL STRIP: NEGATIVE
IMM GRANULOCYTES # BLD AUTO: 0 X10E3/UL (ref 0–0.1)
IMM GRANULOCYTES NFR BLD AUTO: 0 %
KETONES UR QL STRIP: NEGATIVE
LDLC SERPL CALC-MCNC: 69 MG/DL (ref 0–99)
LEUKOCYTE ESTERASE UR QL STRIP: NEGATIVE
LYMPHOCYTES # BLD AUTO: 1.4 X10E3/UL (ref 0.7–3.1)
LYMPHOCYTES NFR BLD AUTO: 23 %
MCH RBC QN AUTO: 31.4 PG (ref 26.6–33)
MCHC RBC AUTO-ENTMCNC: 33.3 G/DL (ref 31.5–35.7)
MCV RBC AUTO: 94 FL (ref 79–97)
MICRO URNS: NORMAL
MICRO URNS: NORMAL
MONOCYTES # BLD AUTO: 0.6 X10E3/UL (ref 0.1–0.9)
MONOCYTES NFR BLD AUTO: 10 %
NEUTROPHILS # BLD AUTO: 4 X10E3/UL (ref 1.4–7)
NEUTROPHILS NFR BLD AUTO: 65 %
NITRITE UR QL STRIP: NEGATIVE
PH UR STRIP: 6.5 [PH] (ref 5–7.5)
PLATELET # BLD AUTO: 244 X10E3/UL (ref 150–450)
POTASSIUM SERPL-SCNC: 5 MMOL/L (ref 3.5–5.2)
PROT SERPL-MCNC: 6.8 G/DL (ref 6–8.5)
PROT UR QL STRIP: NEGATIVE
RBC # BLD AUTO: 4.94 X10E6/UL (ref 4.14–5.8)
RBC #/AREA URNS HPF: NORMAL /HPF (ref 0–2)
SODIUM SERPL-SCNC: 138 MMOL/L (ref 134–144)
SP GR UR STRIP: 1.02 (ref 1–1.03)
TRIGL SERPL-MCNC: 71 MG/DL (ref 0–149)
UROBILINOGEN UR STRIP-MCNC: 0.2 MG/DL (ref 0.2–1)
VLDLC SERPL CALC-MCNC: 14 MG/DL (ref 5–40)
WBC # BLD AUTO: 6.1 X10E3/UL (ref 3.4–10.8)
WBC #/AREA URNS HPF: NORMAL /HPF (ref 0–5)

## 2022-09-13 ENCOUNTER — TELEPHONE (OUTPATIENT)
Dept: FAMILY MEDICINE CLINIC | Facility: CLINIC | Age: 42
End: 2022-09-13

## 2022-09-13 NOTE — TELEPHONE ENCOUNTER
Have you talked to patient regarding this? If not I can call patient and let him know he needs to be seen before.

## 2022-09-13 NOTE — TELEPHONE ENCOUNTER
Caller: Herminio Schneider    Relationship: Self    Best call back number: [phone]    What orders are you requesting (i.e. lab or imaging):COLONOSCOPY    In what timeframe would the patient need to come in: ASAP

## 2022-09-16 ENCOUNTER — TELEMEDICINE (OUTPATIENT)
Dept: FAMILY MEDICINE CLINIC | Facility: CLINIC | Age: 42
End: 2022-09-16

## 2022-09-16 VITALS — BODY MASS INDEX: 26.63 KG/M2 | HEIGHT: 70 IN | WEIGHT: 186 LBS

## 2022-09-16 DIAGNOSIS — F41.9 ANXIETY: Primary | ICD-10-CM

## 2022-09-16 PROCEDURE — 99213 OFFICE O/P EST LOW 20 MIN: CPT | Performed by: NURSE PRACTITIONER

## 2022-10-21 ENCOUNTER — OFFICE VISIT (OUTPATIENT)
Dept: BEHAVIORAL HEALTH | Facility: CLINIC | Age: 42
End: 2022-10-21

## 2022-10-21 VITALS
HEIGHT: 70 IN | WEIGHT: 188.6 LBS | SYSTOLIC BLOOD PRESSURE: 110 MMHG | HEART RATE: 72 BPM | BODY MASS INDEX: 27 KG/M2 | DIASTOLIC BLOOD PRESSURE: 70 MMHG

## 2022-10-21 DIAGNOSIS — F41.1 GENERALIZED ANXIETY DISORDER: Primary | ICD-10-CM

## 2022-10-21 DIAGNOSIS — Z63.0 STRESS DUE TO MARITAL PROBLEMS: ICD-10-CM

## 2022-10-21 PROBLEM — I10 HYPERTENSION: Status: ACTIVE | Noted: 2022-10-21

## 2022-10-21 PROBLEM — J45.909 ASTHMA: Status: ACTIVE | Noted: 2022-10-21

## 2022-10-21 PROCEDURE — 90792 PSYCH DIAG EVAL W/MED SRVCS: CPT | Performed by: NURSE PRACTITIONER

## 2022-10-21 RX ORDER — ALBUTEROL SULFATE 90 UG/1
AEROSOL, METERED RESPIRATORY (INHALATION)
COMMUNITY
Start: 2022-08-23

## 2022-10-21 RX ORDER — FLUTICASONE PROPIONATE AND SALMETEROL XINAFOATE 115; 21 UG/1; UG/1
2 AEROSOL, METERED RESPIRATORY (INHALATION)
COMMUNITY
Start: 2022-10-18

## 2022-10-21 SDOH — SOCIAL STABILITY - SOCIAL INSECURITY: PROBLEMS IN RELATIONSHIP WITH SPOUSE OR PARTNER: Z63.0

## 2022-10-21 NOTE — PATIENT INSTRUCTIONS
"1.  Please return to clinic at your next scheduled visit.  Contact the [facility] ([phone]) or **Nikia, Medical Assistant at [place] Office directly at [phone] at least 24 hours prior in the event you need to cancel.**    2. Should you want to get in touch with your provider, BESSY Morris, please contact MY Medical Assistant, Nikia, directly at [phone].  Recommend saving Nikia's direct number in phone as this is the PREFERRED & EASIEST way to get in contact with your provider.  Please leave a voice mail if you do not get an answer and she will return your call within 24 hrs. You will NOT be able to contact provider on [facility], as Behavioral Health Providers are restricted. YOU MUST CALL [phone]    If you need to speak with the on call provider after hours or on weekends, please Contact the [facility] ([phone]) and staff will be able to page the provider on call directly.        3, MEDICATION REFILLS:  PLEASE CALL THE PHARMACY TO REQUEST ALL MEDICATION REFILLS TO ENSURE YOU ARE RECEIVING YOUR MEDICATIONS IN A TIMELY MANNER.    IF YOU USE AN AUTOMATED SERVICE AT THE PHARMACY FOR REFILLS AND ARE TOLD THERE ARE \"NO REFILLS REMAINING\"   PLEASE CALL THE PHARMACY & SPEAK TO A LIVE PERSON TO VERIFY IT IS THE MOST UP TO DATE PRESCRIPTION ON FILE.    All new prescriptions will have a different number, therefore, if you were given refills for a medication today or at last visit it will not have the same number as the previous prescription.       4.  In the event you have personal crisis, contact the following crisis numbers: Suicide Prevention Hotline 1-388.608.2434 or *988, [place] Helpline 0-142-269-[place]INA; [facility] Emergency Room [phone]; text HELLO to 535426; or 721.      5. We would appreciate your feedback, please scan the QRS code on the back of your appointment card (or see below) and complete a brief survey.  [place] location is still not available, so " "please click \"[place]\" location.  Thank you      SPECIFIC RECOMMENDATIONS:     1.      Medications discussed at this encounter:                   - no medications      2.      Psychotherapy recommendations: Advised patient to contact insurance company to determine available therapist in area and/or check [employer] and filter results based on needs. And to contact provider if a referral order is needed.      3.     Return to clinic: 6 weeks    Please arrive at least 15 minutes before your scheduled appointment time to complete check in process.      IF you are scheduled for a [facility] V[facility] visit, PLEASE ANSWER YOUR PHONE WHEN OFFICE CALLS PRIOR TO VISIT TO COMPLETE THE CHECK IN PROCESS, EVEN IF THE E-CHECK IN WAS COMPLETED.     If you would like to log on to [facility] and complete the \"E-Check IN\" prior to your visit, please do so, this will speed up the check in process.  If you are due for questionnaires, you will find those on [facility] as well, please try to complete prior to your scheduled appointment.          "

## 2022-10-21 NOTE — PROGRESS NOTES
"Subjective   Herminio Schneider is a 41 y.o. male who presents today for initial evaluation     Referring Provider:  Jessica Morrow, APRN  [address] [place] [address]    Chief Complaint:  Anxiety    History of Present Illness:  Patient presents today in office with a history of anxiety for which treatment began 2010.  Patient is currently prescribed no medications. Patient has tried several medications in the past which were effective   Last medications 2016 or 2017.     Patient reports having constant worry about losing wife, infidelity, adequate , fear of no longer wanting to be in a relationship.  In 2019, wife came home \"one day just said I don't know if I want to be  anymore, she started going to the gym, turned off life [employer] location, no sex for 3 months.\"  Patient reports wife \"was doing self care, recently diagnosed with ADHD.\"   Patient has been characterized as \"jealous\" though patient identifies of being \"territorial\".  Endorses to some disbelief if wife would be forthcoming with male relationships at work place.    They have been together since 2008 and denies ever having thoughts of her being unfaithful before.   Describes wife as \"friendly and flirtatious, but that's how she is, I met her at the shelter.\"   Patient reports wife and himself do want to remain . Have considered marriage counseling, though wife wants patient to get care first.  \"She thinks I did the best on Effexor.\"   Patient feels the incident in 2019 has always been present in back of his head.  Patient wife now working on Safety Committee with [employer], and noticed meetings with another male co-worker started with Latricia, now in office, and patient found some text messages between wife and past co-workers, \"I got put on this project with this hot julius\" and other remarks and emoji's which were alarming to patient.  Patient confronted wife about it, and was told it was an inside joke.   Wife has the ability to " work from home or corporate office.  Patient also describes a recent incident of not being honest about the male co-worker whereabouts, as wife had told patient he was on vacation.  Patient also mentions wife has a tendency to not be consistent with reason for various things, which is also alarming to patient.   Patient expresses desire to save marriage, stressors and compulsive desires to find out information and question wife has been unsettling.    Patient is indecisive about medication options, though expressed anticipating wife being displeased as patient reiterated his wife wanted him to get help individually before marriage counseling.     Depression: Patient complains of depression. He complains of anhedonia, depressed mood and fatigue       Anxiety: The patient endorses significant symptoms of anxiety including: excessive anxiety and worry about a number of events or activities for more days than not, restlessness or feeling keyed up, being easily fatigued, irritability and sleep disturbance which have caused impairment in important areas of daily functioning.      PHQ-9 Depression Screening  PHQ-9 Total Score: 5    Little interest or pleasure in doing things? 1-->several days   Feeling down, depressed, or hopeless? 1-->several days   Trouble falling or staying asleep, or sleeping too much? 1-->several days   Feeling tired or having little energy? 1-->several days   Poor appetite or overeating? 0-->not at all   Feeling bad about yourself - or that you are a failure or have let yourself or your family down? 1-->several days   Trouble concentrating on things, such as reading the newspaper or watching television? 0-->not at all   Moving or speaking so slowly that other people could have noticed? Or the opposite - being so fidgety or restless that you have been moving around a lot more than usual? 0-->not at all   Thoughts that you would be better off dead, or of hurting yourself in some way? 0-->not at all    PHQ-9 Total Score 5     CHRISTOPHE-7  Feeling nervous, anxious or on edge: Several days  Not being able to stop or control worrying: Nearly every day  Worrying too much about different things: Nearly every day  Trouble Relaxing: More than half the days  Being so restless that it is hard to sit still: Several days  Feeling afraid as if something awful might happen: More than half the days  Becoming easily annoyed or irritable: Several days  CHRISTOPHE 7 Total Score: 13  If you checked any problems, how difficult have these problems made it for you to do your work, take care of things at home, or get along with other people: Somewhat difficult    Past Surgical History:  Past Surgical History:   Procedure Laterality Date   • FINGER/THUMB CLOSED REDUCTION Right    • NOSE SURGERY  2007       Problem List:  Patient Active Problem List   Diagnosis   • Benign essential hypertension   • Cardiac risk counseling   • Hyperlipidemia   • Asthma   • Hypertension       Allergy:   No Known Allergies     Discontinued Medications:  Medications Discontinued During This Encounter   Medication Reason   • ondansetron ODT (ZOFRAN-ODT) 4 MG disintegrating tablet *Therapy completed   • fluticasone-salmeterol (ADVAIR) 250-50 MCG/DOSE DISKUS *Therapy completed       Current Medications:   Current Outpatient Medications   Medication Sig Dispense Refill   • Advair -21 MCG/ACT inhaler Inhale 2 puffs 2 (Two) Times a Day.     • albuterol sulfate  (90 Base) MCG/ACT inhaler      • atorvastatin (LIPITOR) 40 MG tablet Take 1 tablet by mouth Daily. 90 tablet 1   • finasteride (PROPECIA) 1 MG tablet 1 mg.     • lisinopril (PRINIVIL,ZESTRIL) 20 MG tablet Take 1 tablet by mouth Daily. 90 tablet 1   • omeprazole (priLOSEC) 20 MG capsule Take 1 capsule by mouth Daily. 90 capsule 1   • tadalafil (CIALIS) 5 MG tablet Take 5 mg by mouth Daily.     • vitamin D3 125 MCG (5000 UT) capsule capsule        No current facility-administered medications for this  "visit.       Past Medical History:  Past Medical History:   Diagnosis Date   • Anxiety    • Asthma    • Bulging lumbar disc    • GERD (gastroesophageal reflux disease)    • Hyperlipidemia    • Hypertension        Past Psychiatric History:  Began Treatment:  Diagnoses:Anxiety  Psychiatrist:Denies  Therapist:Denies  Admission History:Denies  Medication Trials:Wellbutrin-\"made uneasy, queazy, nausea,racey feeling\" Effexor-effective; Hydroxyzine 10 mg 10/14/21-22-was given after rib cartilage torn; Zoloft-effLexapro-eff.- Both Decreased Libido took all medications for several years  Self Harm: Denies  Suicide Attempts:Denies      Substance Abuse History:   Types:used to drink alcohol several days per week, more so on off nights from work- 3-4 beer or wine since [employer] quit 2021  Withdrawal Symptoms:Denies  Longest Period Sober:since 2021 stopped for wife  AA: Not applicable     Social History:  Martial Status:  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, 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   Kids:Yes or If so, how many 2  House:Lives in a house   History: Denies  Access to Guns:  yes    Social History     Socioeconomic History   • Marital status:    Tobacco Use   • Smoking status: Former     Types: Cigarettes     Quit date: 2000     Years since quittin.8   • Smokeless tobacco: Never   Vaping Use   • Vaping Use: Never used   Substance and Sexual Activity   • Alcohol use: Not Currently   • Drug use: Not Currently     Types: Marijuana     Comment: in high school   • Sexual activity: Never     Comment: it's been several years ago       Family History:   Suicide Attempts: Denies  Suicide Completions:Denies      Family History   Problem Relation Age of Onset   • Anxiety disorder Mother    • Self-Injurious Behavior  Sister    • Depression Sister    • Anxiety disorder Sister    • Anxiety disorder Maternal Grandmother        Developmental History:   Born: [place]  Siblings:1 sister  Childhood: " "Denies Abuse  High School:Completed  College:some    Mental Status Exam:   Hygiene:   good  Cooperation:  Cooperative  Eye Contact:  Fair  Psychomotor Behavior:  Appropriate  Affect:  Appropriate  Mood: anxious  Speech:  Normal  Thought Process:  Goal directed  Thought Content:  Mood congruent  Suicidal:  None  Homicidal:  None  Hallucinations:  None  Delusion:  None  Memory:  Intact  Orientation:  Grossly intact  Reliability:  good  Insight:  Good  Judgement:  Good  Impulse Control:  Good  Physical/Medical Issues:  Yes BPH,HLD     Review of Systems:  Review of Systems   Constitutional: Positive for fatigue.   Neurological: Negative for seizures, syncope and weakness.   Psychiatric/Behavioral: Positive for sleep disturbance. Negative for decreased concentration, hallucinations, self-injury and suicidal ideas. The patient is nervous/anxious. The patient is not hyperactive.          Physical Exam:  Physical Exam  Psychiatric:         Attention and Perception: Attention and perception normal.         Mood and Affect: Affect normal. Mood is anxious and depressed.         Speech: Speech normal.         Behavior: Behavior normal. Behavior is cooperative.         Thought Content: Thought content normal. Thought content does not include suicidal ideation. Thought content does not include suicidal plan.         Cognition and Memory: Cognition and memory normal.         Judgment: Judgment normal.         Vital Signs:   /70   Pulse 72   Ht 177.8 cm (70\")   Wt 85.5 kg (188 lb 9.6 oz)   BMI 27.06 kg/m²      Lab Results:   Lab Requisition on 08/05/2022   Component Date Value Ref Range Status   • Glucose 08/05/2022 81  65 - 99 mg/dL Final   • BUN 08/05/2022 15  6 - 20 mg/dL Final   • Sodium 08/05/2022 139  136 - 145 mmol/L Final   • Potassium 08/05/2022 4.7  3.5 - 5.2 mmol/L Final   • Chloride 08/05/2022 100  98 - 107 mmol/L Final   • CO2 08/05/2022 29.4 (H)  22.0 - 29.0 mmol/L Final   • Calcium 08/05/2022 9.4  8.6 - " 10.5 mg/dL Final   • Albumin 08/05/2022 4.70  3.50 - 5.20 g/dL Final   • Total Protein 08/05/2022 6.2  6.0 - 8.5 g/dL Final   • AST (SGOT) 08/05/2022 18  1 - 40 U/L Final   • ALT (SGPT) 08/05/2022 22  1 - 41 U/L Final   • Alkaline Phosphatase 08/05/2022 47  39 - 117 U/L Final   • Total Bilirubin 08/05/2022 0.7  0.0 - 1.2 mg/dL Final   • Bilirubin, Direct 08/05/2022 0.2  0.0 - 0.3 mg/dL Final   • Iron 08/05/2022 111  59 - 158 mcg/dL Final   • GGT 08/05/2022 11  8 - 61 U/L Final   • Phosphorus 08/05/2022 3.6  2.5 - 4.5 mg/dL Final   • LDH 08/05/2022 153  135 - 225 U/L Final   • Uric Acid 08/05/2022 4.9  3.4 - 7.0 mg/dL Final   • eGFR Non  Amer 08/05/2022 68  >60 mL/min/1.73 Final   • Creatinine 08/05/2022 1.17  0.76 - 1.27 mg/dL Final   • Globulin 08/05/2022 1.5  gm/dL Final   • A/G Ratio 08/05/2022 3.1  g/dL Final   • Total Cholesterol 08/05/2022 132  0 - 200 mg/dL Final   • Triglycerides 08/05/2022 75  0 - 150 mg/dL Final   • HDL Cholesterol 08/05/2022 52  40 - 60 mg/dL Final   • LDL Cholesterol  08/05/2022 65  0 - 100 mg/dL Final   • VLDL Cholesterol 08/05/2022 15  5 - 40 mg/dL Final   • LDL/HDL Ratio 08/05/2022 1.25   Final   • WBC 08/05/2022 7.17  3.40 - 10.80 10*3/mm3 Final   • RBC 08/05/2022 4.89  4.14 - 5.80 10*6/mm3 Final   • Hemoglobin 08/05/2022 15.7  13.0 - 17.7 g/dL Final   • Hematocrit 08/05/2022 45.6  37.5 - 51.0 % Final   • MCV 08/05/2022 93.3  79.0 - 97.0 fL Final   • MCH 08/05/2022 32.1  26.6 - 33.0 pg Final   • MCHC 08/05/2022 34.4  31.5 - 35.7 g/dL Final   • RDW 08/05/2022 11.1 (L)  12.3 - 15.4 % Final   • RDW-SD 08/05/2022 36.8 (L)  37.0 - 54.0 fl Final   • MPV 08/05/2022 10.1  6.0 - 12.0 fL Final   • Platelets 08/05/2022 285  140 - 450 10*3/mm3 Final   • Neutrophil % 08/05/2022 66.2  42.7 - 76.0 % Final   • Lymphocyte % 08/05/2022 20.8  19.6 - 45.3 % Final   • Monocyte % 08/05/2022 10.7  5.0 - 12.0 % Final   • Eosinophil % 08/05/2022 1.4  0.3 - 6.2 % Final   • Basophil % 08/05/2022 0.6   0.0 - 1.5 % Final   • Immature Grans % 08/05/2022 0.3  0.0 - 0.5 % Final   • Neutrophils, Absolute 08/05/2022 4.75  1.70 - 7.00 10*3/mm3 Final   • Lymphocytes, Absolute 08/05/2022 1.49  0.70 - 3.10 10*3/mm3 Final   • Monocytes, Absolute 08/05/2022 0.77  0.10 - 0.90 10*3/mm3 Final   • Eosinophils, Absolute 08/05/2022 0.10  0.00 - 0.40 10*3/mm3 Final   • Basophils, Absolute 08/05/2022 0.04  0.00 - 0.20 10*3/mm3 Final   • Immature Grans, Absolute 08/05/2022 0.02  0.00 - 0.05 10*3/mm3 Final   • nRBC 08/05/2022 0.0  0.0 - 0.2 /100 WBC Final   • Color, UA 08/05/2022 Yellow  Yellow, Straw Final   • Appearance, UA 08/05/2022 Clear  Clear Final   • pH, UA 08/05/2022 7.0  5.0 - 8.0 Final   • Specific Gravity, UA 08/05/2022 1.020  1.005 - 1.030 Final   • Glucose, UA 08/05/2022 Negative  Negative Final   • Ketones, UA 08/05/2022 Negative  Negative Final   • Bilirubin, UA 08/05/2022 Negative  Negative Final   • Blood, UA 08/05/2022 Negative  Negative Final   • Protein, UA 08/05/2022 Negative  Negative Final   • Leuk Esterase, UA 08/05/2022 Negative  Negative Final   • Nitrite, UA 08/05/2022 Negative  Negative Final   • Urobilinogen, UA 08/05/2022 0.2 E.U./dL  0.2 - 1.0 E.U./dL Final   Office Visit on 07/21/2022   Component Date Value Ref Range Status   • Glucose 07/21/2022 89  65 - 99 mg/dL Final   • BUN 07/21/2022 17  6 - 24 mg/dL Final   • Creatinine 07/21/2022 1.28 (H)  0.76 - 1.27 mg/dL Final   • EGFR Result 07/21/2022 71  >59 mL/min/1.73 Final   • BUN/Creatinine Ratio 07/21/2022 13  9 - 20 Final   • Sodium 07/21/2022 138  134 - 144 mmol/L Final   • Potassium 07/21/2022 5.0  3.5 - 5.2 mmol/L Final   • Chloride 07/21/2022 100  96 - 106 mmol/L Final   • Total CO2 07/21/2022 27  20 - 29 mmol/L Final   • Calcium 07/21/2022 9.8  8.7 - 10.2 mg/dL Final   • Total Protein 07/21/2022 6.8  6.0 - 8.5 g/dL Final   • Albumin 07/21/2022 4.5  4.0 - 5.0 g/dL Final   • Globulin 07/21/2022 2.3  1.5 - 4.5 g/dL Final   • A/G Ratio 07/21/2022  2.0  1.2 - 2.2 Final   • Total Bilirubin 07/21/2022 0.6  0.0 - 1.2 mg/dL Final   • Alkaline Phosphatase 07/21/2022 54  44 - 121 IU/L Final   • AST (SGOT) 07/21/2022 21  0 - 40 IU/L Final   • ALT (SGPT) 07/21/2022 25  0 - 44 IU/L Final   • Total Cholesterol 07/21/2022 132  100 - 199 mg/dL Final   • Triglycerides 07/21/2022 71  0 - 149 mg/dL Final   • HDL Cholesterol 07/21/2022 49  >39 mg/dL Final   • VLDL Cholesterol Paul 07/21/2022 14  5 - 40 mg/dL Final   • LDL Chol Calc (NIH) 07/21/2022 69  0 - 99 mg/dL Final   • WBC 07/21/2022 6.1  3.4 - 10.8 x10E3/uL Final   • RBC 07/21/2022 4.94  4.14 - 5.80 x10E6/uL Final   • Hemoglobin 07/21/2022 15.5  13.0 - 17.7 g/dL Final   • Hematocrit 07/21/2022 46.6  37.5 - 51.0 % Final   • MCV 07/21/2022 94  79 - 97 fL Final   • MCH 07/21/2022 31.4  26.6 - 33.0 pg Final   • MCHC 07/21/2022 33.3  31.5 - 35.7 g/dL Final   • RDW 07/21/2022 11.4 (L)  11.6 - 15.4 % Final   • Platelets 07/21/2022 244  150 - 450 x10E3/uL Final   • Neutrophil Rel % 07/21/2022 65  Not Estab. % Final   • Lymphocyte Rel % 07/21/2022 23  Not Estab. % Final   • Monocyte Rel % 07/21/2022 10  Not Estab. % Final   • Eosinophil Rel % 07/21/2022 1  Not Estab. % Final   • Basophil Rel % 07/21/2022 1  Not Estab. % Final   • Neutrophils Absolute 07/21/2022 4.0  1.4 - 7.0 x10E3/uL Final   • Lymphocytes Absolute 07/21/2022 1.4  0.7 - 3.1 x10E3/uL Final   • Monocytes Absolute 07/21/2022 0.6  0.1 - 0.9 x10E3/uL Final   • Eosinophils Absolute 07/21/2022 0.1  0.0 - 0.4 x10E3/uL Final   • Basophils Absolute 07/21/2022 0.1  0.0 - 0.2 x10E3/uL Final   • Immature Granulocyte Rel % 07/21/2022 0  Not Estab. % Final   • Immature Grans Absolute 07/21/2022 0.0  0.0 - 0.1 x10E3/uL Final   • Specific Gravity, UA 07/21/2022 1.017  1.005 - 1.030 Final   • pH, UA 07/21/2022 6.5  5.0 - 7.5 Final   • Color, UA 07/21/2022 Yellow  Yellow Final   • Appearance, UA 07/21/2022 Clear  Clear Final   • Leukocytes, UA 07/21/2022 Negative  Negative Final    • Protein 07/21/2022 Negative  Negative/Trace Final   • Glucose, UA 07/21/2022 Negative  Negative Final   • Ketones 07/21/2022 Negative  Negative Final   • Blood, UA 07/21/2022 Negative  Negative Final   • Bilirubin, UA 07/21/2022 Negative  Negative Final   • Urobilinogen, UA 07/21/2022 0.2  0.2 - 1.0 mg/dL Final   • Nitrite, UA 07/21/2022 Negative  Negative Final   • Microscopic Examination 07/21/2022 Comment   Final    Microscopic follows if indicated.   • Microscopic Examination 07/21/2022 See below:   Final    Microscopic was indicated and was performed.   • WBC, UA 07/21/2022 None seen  0 - 5 /hpf Final   • RBC, UA 07/21/2022 None seen  0 - 2 /hpf Final   • Epithelial Cells (non renal) 07/21/2022 None seen  0 - 10 /hpf Final   • Casts 07/21/2022 None seen  None seen /lpf Final   • Bacteria, UA 07/21/2022 None seen  None seen/Few Final   • 25 Hydroxy, Vitamin D 07/21/2022 80.0  30.0 - 100.0 ng/mL Final    Comment: Vitamin D deficiency has been defined by the [place]  [employer] and an Endocrine Society practice guideline as a  level of serum 25-OH vitamin D less than 20 ng/mL (1,2).  The Endocrine Society went on to further define vitamin D  insufficiency as a level between 21 and 29 ng/mL (2).  1. IOM ([place] [employer]). 2010. Dietary reference     intakes for calcium and D. [place]: The     National Academies Press.  2. Monik MF, Jose Raul MAXWELL, Sylvie DILL, et al.     Evaluation, treatment, and prevention of vitamin D     deficiency: an Endocrine Society clinical practice     guideline. JCEM. 2011 Jul; 96(7):1911-30.     Hospital Outpatient Visit on 07/15/2022   Component Date Value Ref Range Status   • Target HR (85%) 07/15/2022 150  bpm Final   • Max. Pred. HR (100%) 07/15/2022 177  bpm Final   • BH CV VAS BP RIGHT ARM 07/15/2022 122/62  mmHg Final   • BH CV VAS BP LEFT ARM 07/15/2022 127/62  mmHg Final   • Right Mid CCA PSV 07/15/2022 93/12  cm/sec Final   • Prox ECA PSV 07/15/2022  113/15  cm/sec Final   • Mid ICA PSV 07/15/2022 63/19  cm/sec Final   • ICA/CCA ratio 07/15/2022 0.67   Final   • left Mid CCA PSV 07/15/2022 90/15  cm/sec Final   • Prox ECA PSV 07/15/2022 92/8  cm/sec Final   • Mid ICA PSV 07/15/2022 60/16  cm/sec Final   • ICA/CCA ratio 07/15/2022 0.66   Final   • Prox Ao Diam 07/15/2022 1.61  cm Final   • Mid Ao Diam 07/15/2022 1.61  cm Final   • PAD Right Arm 07/15/2022 122  mmHg Final   • PAD Right Leg PT 07/15/2022 137  mmHg Final   • PAD Right Leg DP 07/15/2022 130  mmHg Final   • PAD Right DIAMANTE PT 07/15/2022 1.08   Final   • PAD Right DIAMANTE DP 07/15/2022 1.02   Final   • PAD Left Arm 07/15/2022 127  mmHg Final   • PAD Left Leg PT 07/15/2022 139  mmHg Final   • PAD Left Leg DP 07/15/2022 130  mmHg Final   • PAD Left DIAMANTE PT 07/15/2022 1.09   Final   • PAD Left DIAMANTE DP 07/15/2022 1.02   Final   • Dist Ao Diam 07/15/2022 1.48  cm Final       EKG Results:  No orders to display       Imaging Results:  No Images in the past 120 days found..      Assessment & Plan   Diagnoses and all orders for this visit:    1. Generalized anxiety disorder (Primary)    2. Stress due to marital problems        Visit Diagnoses:    ICD-10-CM ICD-9-CM   1. Generalized anxiety disorder  F41.1 300.02   2. Stress due to marital problems  Z63.0 V61.10       PLAN:  1. Safety: No acute safety concerns  2. Therapy: None Advised patient to contact insurance company to determine available therapist in area and/or check [employer] and filter results based on needs. And to contact provider if a referral order is needed.  3. Risk Assessment: Risk of self-harm acutely is low.  Risk factors include anxiety disorder, access to guns/weapons, and recent psychosocial stressors (pandemic). Protective factors include no family history, no present SI, no history of suicide attempts or self-harm in the past, minimal AODA, healthcare seeking, future orientation, willingness to engage in care.  Risk of self-harm  chronically is also low, but could be further elevated in the event of treatment noncompliance and/or AODA.  4. Meds: declines   5. Labs: n/a    Patient presentation seems most consistent with anxiety and stress related to marital problems. Advised patient to start marriage counseling as patient would benefit developing communication strategies, coping mechanisms, and working on trust and relationship as this is the primary reason for anxiety.  Discussed medication options, however, at this point therapy would be the best treatment plan. Patient has developed a learned behavior prior to adulthood with trust and reactions to various situations which will take time and therapy.  Patient does not wish to start any medications that may cause decreased libido as this was the reason for stopping other medications in the past.   Patient to contact provider if symptoms worsen or fail to improve.      Patient screened positive for depression based on a PHQ-9 score of 5 on 10/21/2022. Follow-up recommendations include: Suicide Risk Assessment performed.      TREATMENT PLAN/GOALS: Continue supportive psychotherapy efforts and medications as indicated. Treatment and medication options discussed during today's visit. Patient acknowledged and verbally consented to continue with current treatment plan and was educated on the importance of compliance with treatment and follow-up appointments.    MEDICATION ISSUES:  DANA reviewed as expected.  Discussed medication options and treatment plan of prescribed medication as well as the risks, benefits, and side effects including potential falls, possible impaired driving and metabolic adversities among others. Patient is agreeable to call the office with any worsening of symptoms or onset of side effects. Patient is agreeable to call 911 or go to the nearest ER should he/she begin having SI/HI. No medication side effects or related complaints today.     MEDS ORDERED DURING VISIT:  No  orders of the defined types were placed in this encounter.      Return in about 6 weeks (around 12/2/2022) for Next scheduled follow up.         I spent 62 minutes caring for Herminio on this date of service. This time includes time spent by me in the following activities: preparing for the visit, reviewing tests, obtaining and/or reviewing a separately obtained history, performing a medically appropriate examination and/or evaluation, counseling and educating the patient/family/caregiver, referring and communicating with other health care professionals, documenting information in the medical record and care coordination.      This document has been electronically signed by BESSY Morris  October 21, 2022 16:54 EDT      Part of this note may be an electronic transcription/translation of spoken language to printed text using the Dragon Dictation System.

## 2022-12-29 DIAGNOSIS — I10 BENIGN ESSENTIAL HYPERTENSION: ICD-10-CM

## 2022-12-29 RX ORDER — LISINOPRIL 20 MG/1
20 TABLET ORAL DAILY
Qty: 90 TABLET | Refills: 1 | Status: SHIPPED | OUTPATIENT
Start: 2022-12-29

## 2023-01-13 RX ORDER — OMEPRAZOLE 20 MG/1
20 CAPSULE, DELAYED RELEASE ORAL DAILY
Qty: 90 CAPSULE | Refills: 1 | Status: SHIPPED | OUTPATIENT
Start: 2023-01-13

## 2023-06-19 RX ORDER — ATORVASTATIN CALCIUM 40 MG/1
40 TABLET, FILM COATED ORAL DAILY
Qty: 90 TABLET | Refills: 1 | OUTPATIENT
Start: 2023-06-19

## 2023-08-11 ENCOUNTER — HOSPITAL ENCOUNTER (OUTPATIENT)
Dept: CT IMAGING | Facility: HOSPITAL | Age: 43
Discharge: HOME | End: 2023-08-11
Admitting: NURSE PRACTITIONER
Payer: COMMERCIAL

## 2023-08-11 DIAGNOSIS — R91.1 LUNG NODULE: ICD-10-CM

## 2023-08-11 PROCEDURE — 71250 CT THORAX DX C-: CPT

## 2023-09-11 ENCOUNTER — APPOINTMENT (OUTPATIENT)
Facility: HOSPITAL | Age: 43
End: 2023-09-11
Payer: COMMERCIAL

## 2023-09-11 PROCEDURE — 72110 X-RAY EXAM L-2 SPINE 4/>VWS: CPT

## 2023-09-11 PROCEDURE — 73502 X-RAY EXAM HIP UNI 2-3 VIEWS: CPT

## 2023-11-21 ENCOUNTER — HOSPITAL ENCOUNTER (EMERGENCY)
Facility: HOSPITAL | Age: 43
Discharge: HOME | End: 2023-11-22
Attending: EMERGENCY MEDICINE | Admitting: EMERGENCY MEDICINE
Payer: COMMERCIAL

## 2023-11-21 ENCOUNTER — APPOINTMENT (OUTPATIENT)
Dept: CT IMAGING | Facility: HOSPITAL | Age: 43
End: 2023-11-21
Payer: COMMERCIAL

## 2023-11-21 VITALS
SYSTOLIC BLOOD PRESSURE: 142 MMHG | RESPIRATION RATE: 18 BRPM | WEIGHT: 190 LBS | BODY MASS INDEX: 27.2 KG/M2 | TEMPERATURE: 98.2 F | HEART RATE: 62 BPM | OXYGEN SATURATION: 94 % | DIASTOLIC BLOOD PRESSURE: 84 MMHG | HEIGHT: 70 IN

## 2023-11-21 DIAGNOSIS — S39.012A LOW BACK STRAIN, INITIAL ENCOUNTER: Primary | ICD-10-CM

## 2023-11-21 PROCEDURE — 96372 THER/PROPH/DIAG INJ SC/IM: CPT

## 2023-11-21 PROCEDURE — 25010000002 KETOROLAC TROMETHAMINE PER 15 MG: Performed by: EMERGENCY MEDICINE

## 2023-11-21 PROCEDURE — 99284 EMERGENCY DEPT VISIT MOD MDM: CPT

## 2023-11-21 PROCEDURE — 72131 CT LUMBAR SPINE W/O DYE: CPT

## 2023-11-21 PROCEDURE — 99283 EMERGENCY DEPT VISIT LOW MDM: CPT | Performed by: EMERGENCY MEDICINE

## 2023-11-21 RX ORDER — KETOROLAC TROMETHAMINE 30 MG/ML
60 INJECTION, SOLUTION INTRAMUSCULAR; INTRAVENOUS ONCE
Status: COMPLETED | OUTPATIENT
Start: 2023-11-21 | End: 2023-11-21

## 2023-11-21 RX ADMIN — KETOROLAC TROMETHAMINE 60 MG: 30 INJECTION, SOLUTION INTRAMUSCULAR; INTRAVENOUS at 22:49

## 2023-11-21 NOTE — Clinical Note
[facility]  [address]  [place] [address]    Herminio Schneider was seen and treated in our emergency department on 11/21/2023.  He may return to work on 11/24/2023.         Thank you for choosing [facility].    Satya Mims MD

## 2023-11-21 NOTE — Clinical Note
[facility]  [address]  [place] [address]    Herminio Schneider was seen and treated in our emergency department on 11/21/2023.  He may return to work on 11/24/2023.         Thank you for choosing [facility].    Satya Mims MD

## 2023-11-22 RX ORDER — CYCLOBENZAPRINE HCL 10 MG
10 TABLET ORAL 3 TIMES DAILY PRN
Qty: 10 TABLET | Refills: 0 | Status: SHIPPED | OUTPATIENT
Start: 2023-11-22

## 2023-11-22 RX ORDER — PREDNISONE 20 MG/1
20 TABLET ORAL DAILY
Qty: 10 TABLET | Refills: 0 | Status: SHIPPED | OUTPATIENT
Start: 2023-11-22

## 2023-11-22 NOTE — FSED PROVIDER NOTE
Is This A New Presentation, Or A Follow-Up?: Growth Subjective   History of Present Illness  Patient is complaining of low back pain since .  Despite all of this he continues to lift although he does not dead lift 500 pounds anymore that was a part of his repertoire.  The patient has no bowel or bladder incontinence his pain is on the right side radiating into his right buttocks.      Review of Systems   Musculoskeletal:  Positive for back pain.   All other systems reviewed and are negative.      Past Medical History:   Diagnosis Date    Anxiety     Asthma     Bulging lumbar disc     GERD (gastroesophageal reflux disease)     Hyperlipidemia     Hypertension        No Known Allergies    Past Surgical History:   Procedure Laterality Date    FINGER/THUMB CLOSED REDUCTION Right     NOSE SURGERY  2007       Family History   Problem Relation Age of Onset    Anxiety disorder Mother     Self-Injurious Behavior  Sister     Depression Sister     Anxiety disorder Sister     Anxiety disorder Maternal Grandmother        Social History     Socioeconomic History    Marital status:    Tobacco Use    Smoking status: Former     Types: Cigarettes     Quit date:      Years since quittin.9    Smokeless tobacco: Never   Vaping Use    Vaping Use: Never used   Substance and Sexual Activity    Alcohol use: Not Currently    Drug use: Not Currently     Types: Marijuana     Comment: in high school    Sexual activity: Never     Comment: it's been several years ago           Objective   Physical Exam  Vitals and nursing note reviewed.   Constitutional:       General: He is not in acute distress.     Appearance: Normal appearance. He is not ill-appearing, toxic-appearing or diaphoretic.   HENT:      Head: Normocephalic and atraumatic.   Eyes:      Extraocular Movements: Extraocular movements intact.      Pupils: Pupils are equal, round, and reactive to light.   Pulmonary:      Effort: Pulmonary effort is normal.   Musculoskeletal:         General: Tenderness present. Normal  How Severe Is Your Skin Lesion?: mild range of motion.      Comments: Tenderness L4-S1 on the patient's right-hand side reproduces his tenderness of his paravertebral musculature of his lumbar spine   Skin:     General: Skin is warm and dry.      Capillary Refill: Capillary refill takes less than 2 seconds.   Neurological:      Mental Status: He is alert and oriented to person, place, and time.   Psychiatric:         Mood and Affect: Mood normal.         Procedures           ED Course                                           Medical Decision Making  Patient has no pain at the T12 area where they are considering their possibility of chronic versus acute with chronic being favored however the patient's not tender at all there.  He is tender at L4 L5-S1.  Nonetheless a referral to his primary he should have this done first and then physical therapy and decision on whether he can get an MRI.    Patient states that he is feeling better after the IM injection    Amount and/or Complexity of Data Reviewed  Radiology: ordered.     Details: Patient has no pain at the T12 area where they are considering their possibility of chronic versus acute with chronic being favored however the patient's not tender at all there.  He is tender at L4 L5-S1.  Nonetheless a referral to his primary he should have this done first and then physical therapy and decision on whether he can get an MRI.    Risk  Prescription drug management.        Final diagnoses:   Low back strain, initial encounter       ED Disposition  ED Disposition       ED Disposition   Discharge    Condition   Stable    Comment   --               Jessica Morrow, APRN  [address] [id number] [phone]    In 3 days  See your primary to discuss possibility of physical therapy versus MRI versus bone         Medication List        New Prescriptions      cyclobenzaprine 10 MG tablet  Commonly known as: FLEXERIL  Take 1 tablet by mouth 3 (Three) Times a Day As Needed for Muscle Spasms.      predniSONE 20 MG tablet  Commonly known as: DELTASONE  Take 1 tablet by mouth Daily.               Where to Get Your Medications        These medications were sent to [facility] - [place], [place] - [facility] [address] - [phone]  - [phone] [facility] [address], [facility] [address]      Phone: [phone]   cyclobenzaprine 10 MG tablet  predniSONE 20 MG tablet          Have Your Skin Lesions Been Treated?: not been treated

## 2023-11-22 NOTE — DISCHARGE INSTRUCTIONS
Avoid excessive sitting and standing.  Take the muscle relaxer and the prednisone to help with relief.  No lifting of any kind while you are under therapy to try to get your back better.  Follow-up with your primary to discuss MRI physical therapy and plan of action for your chronic back pain.

## 2023-12-20 RX ORDER — ATORVASTATIN CALCIUM 40 MG/1
40 TABLET, FILM COATED ORAL DAILY
Qty: 90 TABLET | Refills: 1 | Status: SHIPPED | OUTPATIENT
Start: 2023-12-20

## 2024-01-03 ENCOUNTER — OFFICE VISIT (OUTPATIENT)
Dept: FAMILY MEDICINE CLINIC | Facility: CLINIC | Age: 44
End: 2024-01-03
Payer: COMMERCIAL

## 2024-01-03 VITALS
OXYGEN SATURATION: 97 % | BODY MASS INDEX: 28.77 KG/M2 | SYSTOLIC BLOOD PRESSURE: 112 MMHG | TEMPERATURE: 97.1 F | DIASTOLIC BLOOD PRESSURE: 82 MMHG | WEIGHT: 201 LBS | HEIGHT: 70 IN | HEART RATE: 98 BPM

## 2024-01-03 DIAGNOSIS — I10 BENIGN ESSENTIAL HYPERTENSION: ICD-10-CM

## 2024-01-03 DIAGNOSIS — E55.9 VITAMIN D DEFICIENCY: ICD-10-CM

## 2024-01-03 DIAGNOSIS — Z13.0 SCREENING FOR DEFICIENCY ANEMIA: ICD-10-CM

## 2024-01-03 DIAGNOSIS — R91.1 LUNG NODULE: ICD-10-CM

## 2024-01-03 DIAGNOSIS — E78.2 MIXED HYPERLIPIDEMIA: ICD-10-CM

## 2024-01-03 DIAGNOSIS — M54.50 LUMBAR PAIN: Primary | ICD-10-CM

## 2024-01-03 DIAGNOSIS — Z12.11 SCREENING FOR COLON CANCER: ICD-10-CM

## 2024-01-03 NOTE — PROGRESS NOTES
"Chief Complaint  Back Pain (Patient is here to discuss a MRI for his Lower back pain. Patient states that it bothers him more when he stands in the same position for a long time. )    Subjective        Herminio Schneider presents to [facility]  History of Present Illness  This is a 43-year-old male patient here today for evaluation of his lumbar pain.  Patient reports going to the urgent care for evaluation of lumbar pain.  He did have CT scan done which did recommend MRI.  He was given muscle relaxers and steroids which did help but has ran out.  He is very active and lifts weights but has limited that since he is having issues.  He describes his pain in right buttocks which will sometimes migrate down to his gluteal area.  He also says that he has pain that will be more in his sacral area.  Pain is worse if he sits for a long period of time or stands on concrete.  He denies any numbness or tingling.  He does have hypertension and is on lisinopril and doing well.  He denies any chest pain or shortness of breath.  He does take Lipitor for hyperlipidemia and is tolerating well.  He does report limiting fatty foods.  He does take omeprazole for GERD with no new complaints.  He has had a CT in the past that showed lung nodule and recommendations was to repeat in 3 to 6 months.  He denies any difficulty with his breathing today.        Objective   Vital Signs:  /82   Pulse 98   Temp 97.1 °F (36.2 °C)   Ht 177.8 cm (70\")   Wt 91.2 kg (201 lb)   SpO2 97%   BMI 28.84 kg/m²   Estimated body mass index is 28.84 kg/m² as calculated from the following:    Height as of this encounter: 177.8 cm (70\").    Weight as of this encounter: 91.2 kg (201 lb).               Physical Exam  Vitals and nursing note reviewed.   HENT:      Head: Normocephalic.      Nose: Nose normal.   Eyes:      Pupils: Pupils are equal, round, and reactive to light.   Cardiovascular:      Rate and Rhythm: Normal rate and " regular rhythm.      Pulses: Normal pulses.      Heart sounds: Normal heart sounds.   Pulmonary:      Effort: Pulmonary effort is normal. No respiratory distress.      Breath sounds: Normal breath sounds. No wheezing or rales.   Abdominal:      General: Bowel sounds are normal. There is no distension.      Tenderness: There is no abdominal tenderness.   Musculoskeletal:         General: No swelling.      Cervical back: Neck supple.      Right lower leg: No edema.      Left lower leg: No edema.   Skin:     General: Skin is warm and dry.   Neurological:      Mental Status: He is alert and oriented to person, place, and time.   Psychiatric:         Mood and Affect: Mood normal.        Result Review :                   Assessment and Plan   Diagnoses and all orders for this visit:    1. Lumbar pain (Primary)  -     Ambulatory Referral to Physical Therapy Evaluate and treat  -     MRI Lumbar Spine Without Contrast; Future  -     Lipid Panel    2. Lung nodule  -     CT Chest Without Contrast; Future    3. Benign essential hypertension  -     Urinalysis With Culture If Indicated -    4. Vitamin D deficiency  -     Vitamin D 25 hydroxy    5. Mixed hyperlipidemia  -     Comprehensive Metabolic Panel  -     Lipid Panel    6. Screening for deficiency anemia  -     CBC & Differential    7. Screening for colon cancer  -     Ambulatory Referral to General Surgery    Other orders  -     diclofenac (VOLTAREN) 75 MG EC tablet; Take 1 tablet by mouth 2 (Two) Times a Day As Needed (back pain).  Dispense: 60 tablet; Refill: 1  -     methocarbamol (ROBAXIN) 750 MG tablet; Take 1 tablet by mouth 3 (Three) Times a Day.  Dispense: 90 tablet; Refill: 1    I will give him Robaxin and Voltaren for his back for now.  We did discuss physical therapy and will put a referral in for that along with MRI.  He will return for fasting labs.  I will put an order in to repeat his CT scan of his lungs.  We did discuss possible referral to pulmonology if  needed.  His plan is to return in 6 months or sooner if needed.    I spent a total of  30 minutes with the patient today including face-to-face encounter, reviewing data in the system, coordination of care with the nursing staff as well as consultants, documentation and entering orders.           Follow Up   No follow-ups on file.  Patient was given instructions and counseling regarding his condition or for health maintenance advice. Please see specific information pulled into the AVS if appropriate.

## 2024-01-04 RX ORDER — DICLOFENAC SODIUM 75 MG/1
75 TABLET, DELAYED RELEASE ORAL 2 TIMES DAILY PRN
Qty: 60 TABLET | Refills: 1 | Status: SHIPPED | OUTPATIENT
Start: 2024-01-04

## 2024-01-04 RX ORDER — METHOCARBAMOL 750 MG/1
750 TABLET, FILM COATED ORAL 3 TIMES DAILY
Qty: 90 TABLET | Refills: 1 | Status: SHIPPED | OUTPATIENT
Start: 2024-01-04

## 2024-01-05 RX ORDER — OMEPRAZOLE 20 MG/1
20 CAPSULE, DELAYED RELEASE ORAL DAILY
Qty: 30 CAPSULE | Refills: 1 | Status: SHIPPED | OUTPATIENT
Start: 2024-01-05

## 2024-01-23 LAB
25(OH)D3+25(OH)D2 SERPL-MCNC: 48.6 NG/ML (ref 30–100)
ALBUMIN SERPL-MCNC: 4.4 G/DL (ref 3.5–5.2)
ALBUMIN/GLOB SERPL: 2.4 G/DL
ALP SERPL-CCNC: 48 U/L (ref 39–117)
ALT SERPL-CCNC: 30 U/L (ref 1–41)
APPEARANCE UR: CLEAR
AST SERPL-CCNC: 19 U/L (ref 1–40)
BACTERIA #/AREA URNS HPF: NORMAL /HPF
BASOPHILS # BLD AUTO: 0.04 10*3/MM3 (ref 0–0.2)
BASOPHILS NFR BLD AUTO: 0.6 % (ref 0–1.5)
BILIRUB SERPL-MCNC: 0.4 MG/DL (ref 0–1.2)
BILIRUB UR QL STRIP: NEGATIVE
BUN SERPL-MCNC: 19 MG/DL (ref 6–20)
BUN/CREAT SERPL: 15.8 (ref 7–25)
CALCIUM SERPL-MCNC: 9.2 MG/DL (ref 8.6–10.5)
CASTS URNS QL MICRO: NORMAL /LPF
CHLORIDE SERPL-SCNC: 101 MMOL/L (ref 98–107)
CHOLEST SERPL-MCNC: 135 MG/DL (ref 0–200)
CO2 SERPL-SCNC: 27.3 MMOL/L (ref 22–29)
COLOR UR: YELLOW
CREAT SERPL-MCNC: 1.2 MG/DL (ref 0.76–1.27)
EGFRCR SERPLBLD CKD-EPI 2021: 76 ML/MIN/1.73
EOSINOPHIL # BLD AUTO: 0.1 10*3/MM3 (ref 0–0.4)
EOSINOPHIL NFR BLD AUTO: 1.4 % (ref 0.3–6.2)
EPI CELLS #/AREA URNS HPF: NORMAL /HPF (ref 0–10)
ERYTHROCYTE [DISTWIDTH] IN BLOOD BY AUTOMATED COUNT: 11.3 % (ref 12.3–15.4)
GLOBULIN SER CALC-MCNC: 1.8 GM/DL
GLUCOSE SERPL-MCNC: 83 MG/DL (ref 65–99)
GLUCOSE UR QL STRIP: NEGATIVE
HCT VFR BLD AUTO: 40.3 % (ref 37.5–51)
HDLC SERPL-MCNC: 50 MG/DL (ref 40–60)
HGB BLD-MCNC: 14 G/DL (ref 13–17.7)
HGB UR QL STRIP: NEGATIVE
IMM GRANULOCYTES # BLD AUTO: 0.01 10*3/MM3 (ref 0–0.05)
IMM GRANULOCYTES NFR BLD AUTO: 0.1 % (ref 0–0.5)
KETONES UR QL STRIP: NEGATIVE
LDLC SERPL CALC-MCNC: 67 MG/DL (ref 0–100)
LEUKOCYTE ESTERASE UR QL STRIP: NEGATIVE
LYMPHOCYTES # BLD AUTO: 2.46 10*3/MM3 (ref 0.7–3.1)
LYMPHOCYTES NFR BLD AUTO: 35.2 % (ref 19.6–45.3)
MCH RBC QN AUTO: 32.3 PG (ref 26.6–33)
MCHC RBC AUTO-ENTMCNC: 34.7 G/DL (ref 31.5–35.7)
MCV RBC AUTO: 92.9 FL (ref 79–97)
MICRO URNS: NORMAL
MICRO URNS: NORMAL
MONOCYTES # BLD AUTO: 0.67 10*3/MM3 (ref 0.1–0.9)
MONOCYTES NFR BLD AUTO: 9.6 % (ref 5–12)
NEUTROPHILS # BLD AUTO: 3.71 10*3/MM3 (ref 1.7–7)
NEUTROPHILS NFR BLD AUTO: 53.1 % (ref 42.7–76)
NITRITE UR QL STRIP: NEGATIVE
NRBC BLD AUTO-RTO: 0 /100 WBC (ref 0–0.2)
PH UR STRIP: 5.5 [PH] (ref 5–7.5)
PLATELET # BLD AUTO: 230 10*3/MM3 (ref 140–450)
POTASSIUM SERPL-SCNC: 3.9 MMOL/L (ref 3.5–5.2)
PROT SERPL-MCNC: 6.2 G/DL (ref 6–8.5)
PROT UR QL STRIP: NEGATIVE
RBC # BLD AUTO: 4.34 10*6/MM3 (ref 4.14–5.8)
RBC #/AREA URNS HPF: NORMAL /HPF (ref 0–2)
SODIUM SERPL-SCNC: 136 MMOL/L (ref 136–145)
SP GR UR STRIP: 1.02 (ref 1–1.03)
TRIGL SERPL-MCNC: 94 MG/DL (ref 0–150)
URINALYSIS REFLEX: NORMAL
UROBILINOGEN UR STRIP-MCNC: 0.2 MG/DL (ref 0.2–1)
VLDLC SERPL CALC-MCNC: 18 MG/DL (ref 5–40)
WBC # BLD AUTO: 6.99 10*3/MM3 (ref 3.4–10.8)
WBC #/AREA URNS HPF: NORMAL /HPF (ref 0–5)

## 2024-01-25 DIAGNOSIS — Z12.11 COLON CANCER SCREENING: Primary | ICD-10-CM

## 2024-01-30 ENCOUNTER — HOSPITAL ENCOUNTER (OUTPATIENT)
Dept: CT IMAGING | Facility: HOSPITAL | Age: 44
Discharge: HOME | End: 2024-01-30
Payer: COMMERCIAL

## 2024-01-30 ENCOUNTER — HOSPITAL ENCOUNTER (OUTPATIENT)
Dept: MRI IMAGING | Facility: HOSPITAL | Age: 44
Discharge: HOME | End: 2024-01-30
Payer: COMMERCIAL

## 2024-01-30 DIAGNOSIS — R91.1 LUNG NODULE: ICD-10-CM

## 2024-01-30 DIAGNOSIS — M54.50 LUMBAR PAIN: ICD-10-CM

## 2024-01-30 PROCEDURE — 72148 MRI LUMBAR SPINE W/O DYE: CPT

## 2024-01-30 PROCEDURE — 71250 CT THORAX DX C-: CPT

## 2024-01-31 DIAGNOSIS — M51.26 LUMBAR DISC HERNIATION: Primary | ICD-10-CM

## 2024-02-05 NOTE — PROGRESS NOTES
Subjective   History of Present Illness: Herminio Schneider is a 43 y.o. male is being seen for consultation today at the request of BESSY Jain for back pain that radiates in to the right buttock. He denies numbness, tingling and weakness. He has tried muscle relaxer's. He reports that he has backed off lifting things and it has helped his pain improve.  He has had issues for many years and even describes issues back in 2010.  On most days he will have pain on the right side of the back into the right buttock but he has had episodes over the last 6 months of pain down the leg and a weak sensation that occurred 2 days in a row where he felt like both legs were weak.  He denies any bowel or bladder symptoms.  His symptoms seem to correlate with lifting weights as he used to be very aggressive with weight lifting in his youth and has made attempts over the years to try to return to lifting weights and seems to have aggravations in his back when doing so.  As he backs off on the weight lifting his back tends to get better.    He chronically sees a chiropractor for neck issues and the chiropractor has worked on his back on occasion.  He has not had physical therapy on his back nor is he had any other advanced treatments for lumbar disc disease.    Back Pain  The pain is present in the lumbar spine and gluteal. The pain is moderate. The symptoms are aggravated by position. Pertinent negatives include no numbness, tingling or weakness. He has tried muscle relaxant for the symptoms.       Tobacco Use: Medium Risk (2/8/2024)    Patient History     Smoking Tobacco Use: Former     Smokeless Tobacco Use: Never     Passive Exposure: Not on file        The following portions of the patient's history were reviewed and updated as appropriate: allergies, current medications, past family history, past medical history, past social history, past surgical history and problem list.    Review of Systems   Musculoskeletal:  Positive  "for back pain.   Neurological:  Negative for tingling, weakness and numbness.       Objective     Vitals:    02/08/24 0933   BP: 120/80   Weight: 91.2 kg (201 lb)   Height: 177.8 cm (70\")     Body mass index is 28.84 kg/m².      Physical Exam  Neurologic Exam    Physical Exam:    CONSTITUTIONAL: This 43 year old  male appears well developed, well-nourished and in no acute distress.    HEAD & FACE: the head and face are symmetric, normocephalic and atraumatic.    EYES: Inspection of the conjunctivae and lids reveals no swelling, erythema or discharge.  Pupils are round, equal and reactive to light and there is no scleral icterus.    EARS, NOSE, MOUTH & THROAT: On inspection, the ears and nose are within normal limits.    NECK: the neck is supple and symmetric. The trachea is midline with no masses.    PULMONARY: Respiratory effort is normal with no increased work of breathing or signs of respiratory distress.    CARDIOVASCULAR:  Examination of the extremities shows no edema or varicosities.    MUSCULOSKELETAL: Gait and station are within normal limits. The spine has normal alignment and range of motion.    SKIN: The skin is warm, dry and intact    NEUROLOGIC:   Cranial Nerves 2-12 intact  Normal motor strength noted. Muscle bulk and tone are normal.  Sensory exam is normal to fine touch to confrontational testing bilaterally  Reflexes on the right side demonstrates 2/4 Knee Jerk Reflex, 2/4 Ankle Jerk Reflex and no ankle clonus on the right.   Reflexes on the left side demonstrates 2/4 Knee Jerk Reflex, 2/4 Ankle Jerk Reflex and no ankle clonus on the left.  Superficial/Primitive Reflexes: primitive reflexes were absent.  Suresh's, Babinski, and Clonus signs all negative.  No coordination deficit observed.  Radicular testing showed a negative Bernardo (DAWN) test and negative straight leg raise with right buttock pain only on the right.  Cortical function is intact and without deficits. Speech is " normal.    PSYCHIATRIC: oriented to person, place and time. Patient's mood and affect are normal.    Assessment & Plan   Independent Review of Radiographic Studies:      I personally reviewed the images from the following studies.    MRI of the lumbar spine without contrast was done on January 30, 2024 and documents a superiorly directed disc herniation under the facet joint within the foramen at L4-L5 coming in contact with the L4 nerve root under the L4 pedicle.        Medical Decision Making:      From the neurosurgical perspective there is no surgical option for isolated back pain. There is no persistent radiculopathy or loss of nerve function on exam despite the finding of the disc herniation to the right at L4-5.  Therefore based on his clinical exam I cannot justify surgery for his pattern of pain. Generally most people respond favorably to physical therapy, NSAIDs or oral steroids when appropriate and he has done well with Medrol Dosepaks in the past.  He has not had physical therapy and therefore we will order that to start.  In difficult situations, pain management such as lumbar epidural steroids or facet rhizotomies are an option and he is going to consult with a pain management specialist to discuss those options.     Since I cannot justify surgical treatment clinically as he does not have an L4 or L5 radiculopathy in the right lower extremity, we will initiate conservative treatment and only plan on follow up as needed if surgical questions arise.    Return for any new or recurrent neurosurgical problems.    Diagnoses and all orders for this visit:    1. Chronic right-sided low back pain without sciatica (Primary)  -     Ambulatory Referral to Physical Therapy Evaluate and treat  -     Ambulatory Referral to Pain Management Clinic    2. Lumbar disc herniation  -     Ambulatory Referral to Physical Therapy Evaluate and treat  -     Ambulatory Referral to Pain Management Clinic             Yemi CELIS  MD Myles FACS [facility]  Neurological Surgery

## 2024-02-08 ENCOUNTER — OFFICE VISIT (OUTPATIENT)
Dept: NEUROSURGERY | Facility: CLINIC | Age: 44
End: 2024-02-08
Payer: COMMERCIAL

## 2024-02-08 VITALS
SYSTOLIC BLOOD PRESSURE: 120 MMHG | WEIGHT: 201 LBS | BODY MASS INDEX: 28.77 KG/M2 | HEIGHT: 70 IN | DIASTOLIC BLOOD PRESSURE: 80 MMHG

## 2024-02-08 DIAGNOSIS — M54.50 CHRONIC RIGHT-SIDED LOW BACK PAIN WITHOUT SCIATICA: Primary | ICD-10-CM

## 2024-02-08 DIAGNOSIS — M51.26 LUMBAR DISC HERNIATION: ICD-10-CM

## 2024-02-08 DIAGNOSIS — G89.29 CHRONIC RIGHT-SIDED LOW BACK PAIN WITHOUT SCIATICA: Primary | ICD-10-CM

## 2024-02-08 PROBLEM — M51.16 LUMBAR DISC HERNIATION WITH RADICULOPATHY: Status: ACTIVE | Noted: 2024-02-08

## 2024-02-08 PROCEDURE — 99204 OFFICE O/P NEW MOD 45 MIN: CPT | Performed by: NEUROLOGICAL SURGERY

## 2024-03-01 RX ORDER — METHOCARBAMOL 750 MG/1
750 TABLET, FILM COATED ORAL 3 TIMES DAILY
Qty: 90 TABLET | Refills: 1 | Status: SHIPPED | OUTPATIENT
Start: 2024-03-01

## 2024-03-01 RX ORDER — DICLOFENAC SODIUM 75 MG/1
75 TABLET, DELAYED RELEASE ORAL 2 TIMES DAILY PRN
Qty: 60 TABLET | Refills: 1 | Status: SHIPPED | OUTPATIENT
Start: 2024-03-01

## 2024-03-01 RX ORDER — OMEPRAZOLE 20 MG/1
20 CAPSULE, DELAYED RELEASE ORAL DAILY
Qty: 30 CAPSULE | Refills: 1 | Status: SHIPPED | OUTPATIENT
Start: 2024-03-01

## 2024-03-08 PROBLEM — Z12.11 COLON CANCER SCREENING: Status: ACTIVE | Noted: 2024-01-25

## 2024-03-15 ENCOUNTER — ANESTHESIA EVENT (OUTPATIENT)
Dept: GASTROENTEROLOGY | Facility: HOSPITAL | Age: 44
End: 2024-03-15
Payer: COMMERCIAL

## 2024-03-15 ENCOUNTER — HOSPITAL ENCOUNTER (OUTPATIENT)
Facility: HOSPITAL | Age: 44
Setting detail: HOSPITAL OUTPATIENT SURGERY
Discharge: HOME | End: 2024-03-15
Attending: SURGERY | Admitting: SURGERY
Payer: COMMERCIAL

## 2024-03-15 ENCOUNTER — ANESTHESIA (OUTPATIENT)
Dept: GASTROENTEROLOGY | Facility: HOSPITAL | Age: 44
End: 2024-03-15
Payer: COMMERCIAL

## 2024-03-15 VITALS
HEIGHT: 70 IN | SYSTOLIC BLOOD PRESSURE: 129 MMHG | BODY MASS INDEX: 26.63 KG/M2 | WEIGHT: 186 LBS | HEART RATE: 54 BPM | RESPIRATION RATE: 16 BRPM | OXYGEN SATURATION: 98 % | DIASTOLIC BLOOD PRESSURE: 79 MMHG

## 2024-03-15 DIAGNOSIS — Z12.11 COLON CANCER SCREENING: ICD-10-CM

## 2024-03-15 PROCEDURE — 88305 TISSUE EXAM BY PATHOLOGIST: CPT | Performed by: SURGERY

## 2024-03-15 PROCEDURE — 25010000002 PROPOFOL 10 MG/ML EMULSION

## 2024-03-15 PROCEDURE — S0260 H&P FOR SURGERY: HCPCS | Performed by: SURGERY

## 2024-03-15 PROCEDURE — 45385 COLONOSCOPY W/LESION REMOVAL: CPT | Performed by: SURGERY

## 2024-03-15 PROCEDURE — 25810000003 LACTATED RINGERS PER 1000 ML: Performed by: SURGERY

## 2024-03-15 RX ORDER — PROPOFOL 10 MG/ML
VIAL (ML) INTRAVENOUS AS NEEDED
Status: DISCONTINUED | OUTPATIENT
Start: 2024-03-15 | End: 2024-03-15 | Stop reason: SURG

## 2024-03-15 RX ORDER — SODIUM CHLORIDE 0.9 % (FLUSH) 0.9 %
10 SYRINGE (ML) INJECTION EVERY 12 HOURS SCHEDULED
Status: DISCONTINUED | OUTPATIENT
Start: 2024-03-15 | End: 2024-03-15 | Stop reason: HOSPADM

## 2024-03-15 RX ORDER — SODIUM CHLORIDE 0.9 % (FLUSH) 0.9 %
10 SYRINGE (ML) INJECTION AS NEEDED
Status: DISCONTINUED | OUTPATIENT
Start: 2024-03-15 | End: 2024-03-15 | Stop reason: HOSPADM

## 2024-03-15 RX ORDER — LIDOCAINE HYDROCHLORIDE 20 MG/ML
INJECTION, SOLUTION INFILTRATION; PERINEURAL AS NEEDED
Status: DISCONTINUED | OUTPATIENT
Start: 2024-03-15 | End: 2024-03-15 | Stop reason: SURG

## 2024-03-15 RX ORDER — SODIUM CHLORIDE, SODIUM LACTATE, POTASSIUM CHLORIDE, CALCIUM CHLORIDE 600; 310; 30; 20 MG/100ML; MG/100ML; MG/100ML; MG/100ML
30 INJECTION, SOLUTION INTRAVENOUS CONTINUOUS PRN
Status: DISCONTINUED | OUTPATIENT
Start: 2024-03-15 | End: 2024-03-15 | Stop reason: HOSPADM

## 2024-03-15 RX ORDER — SODIUM CHLORIDE 9 MG/ML
40 INJECTION, SOLUTION INTRAVENOUS AS NEEDED
Status: DISCONTINUED | OUTPATIENT
Start: 2024-03-15 | End: 2024-03-15 | Stop reason: HOSPADM

## 2024-03-15 RX ADMIN — LIDOCAINE HYDROCHLORIDE 80 MG: 20 INJECTION, SOLUTION INFILTRATION; PERINEURAL at 14:59

## 2024-03-15 RX ADMIN — PROPOFOL 200 MCG/KG/MIN: 10 INJECTION, EMULSION INTRAVENOUS at 15:00

## 2024-03-15 RX ADMIN — PROPOFOL 100 MG: 10 INJECTION, EMULSION INTRAVENOUS at 14:59

## 2024-03-15 RX ADMIN — SODIUM CHLORIDE, POTASSIUM CHLORIDE, SODIUM LACTATE AND CALCIUM CHLORIDE 30 ML/HR: 600; 310; 30; 20 INJECTION, SOLUTION INTRAVENOUS at 14:11

## 2024-03-15 NOTE — H&P
Colorectal & General Surgery  History and Physical    Patient: Herminio Schneider  YOB: 1980  MRN: [id number]      Assessment  Herminio Schneider is a 43 y.o. male here for colorectal cancer screening.  He has no high risk symptoms.    Plan  Proceed with screening colonoscopy      History of Present Illness   Herminio Schneider is a 43 y.o. male who presents for colorectal cancer screening.  He denies hematochezia, tenesmus, melena, changes in bowel habits, weight loss.  He does have family history of colorectal cancer in a cousin.    Past Medical History   Past Medical History:   Diagnosis Date    Anxiety     Asthma     Bulging lumbar disc     GERD (gastroesophageal reflux disease)     History of COVID-2022    Hyperlipidemia     Hypertension         Past Surgical History   Past Surgical History:   Procedure Laterality Date    FINGER/THUMB CLOSED REDUCTION Right     LASIK Bilateral     NOSE SURGERY  2007    VASECTOMY         Social History  Social History     Socioeconomic History    Marital status:    Tobacco Use    Smoking status: Former     Current packs/day: 0.00     Types: Cigarettes     Quit date:      Years since quittin.2    Smokeless tobacco: Never   Vaping Use    Vaping status: Never Used   Substance and Sexual Activity    Alcohol use: Not Currently    Drug use: Not Currently     Types: Marijuana     Comment: in high school    Sexual activity: Never     Comment: it's been several years ago       Family History  Family History   Problem Relation Age of Onset    Anxiety disorder Mother     Self-Injurious Behavior  Sister     Depression Sister     Anxiety disorder Sister     Anxiety disorder Maternal Grandmother     Malig Hyperthermia Neg Hx        Review of Systems  Negative except as documented in the HPI.     Allergies  No Known Allergies    Medications    Current Facility-Administered Medications:     lactated ringers infusion, 30 mL/hr, Intravenous, Continuous PRN, Margarette,  Ankit Gupta MD, Last Rate: 30 mL/hr at 03/15/24 1411, 30 mL/hr at 03/15/24 1411    sodium chloride 0.9 % flush 10 mL, 10 mL, Intravenous, Q12H, Ankit Pfeiffer MD    sodium chloride 0.9 % flush 10 mL, 10 mL, Intravenous, PRN, Ankit Pfeiffer MD    sodium chloride 0.9 % infusion 40 mL, 40 mL, Intravenous, PRN, Ankit Pfeiffer MD    Vital Signs  Vitals:    03/15/24 1359   BP: 120/68   Pulse: 62   Resp: 12   SpO2: 97%        Physical Exam  Constitutional: Resting comfortably, no acute distress  Neck: Supple, trachea midline  Respiratory: No increased work of breathing, Symmetric excursion  Cardiovascular: Well pefursed, no jugular venous distention evident   Abdominal:  Soft, non-tender, non-distended  Lymphatics: No cervical or suprascapular adenopathy  Skin: Warm, dry, no rash on visualized skin surfaces  Musculoskeletal: Symmetric strength, no obvious gross abnormalities  Psychiatric: Alert and oriented ×3, normal affect            Richy Pfeiffer MD  Colorectal & General Surgery  [facility]    [address], [address]  [place], [place], [address]  P: [phone]  F: [phone]

## 2024-03-15 NOTE — ANESTHESIA POSTPROCEDURE EVALUATION
"Patient: Herminio Schneider    Procedure Summary       Date: 03/15/24 Room / Location:  [facility] /  [facility]    Anesthesia Start: 1456 Anesthesia Stop: 1529    Procedure: COLONOSCOPY to cecum with cold snare polypectomy Diagnosis:       Colon cancer screening      (Colon cancer screening [Z12.11])    Surgeons: Ankit Pfeiffer MD Provider: Leilani Vasques MD    Anesthesia Type: MAC ASA Status: 2            Anesthesia Type: MAC    Vitals  Vitals Value Taken Time   /79 03/15/24 1553   Temp     Pulse 65 03/15/24 1558   Resp 16 03/15/24 1553   SpO2 100 % 03/15/24 1559   Vitals shown include unfiled device data.        Post Anesthesia Care and Evaluation    Level of consciousness: awake and alert  Pain management: adequate  Anesthetic complications: No anesthetic complications    Cardiovascular status: acceptable  Respiratory status: acceptable  Hydration status: acceptable    Comments: /79 (BP Location: Left arm, Patient Position: Lying)   Pulse 54   Resp 16   Ht 177.8 cm (70\")   Wt 84.4 kg (186 lb)   SpO2 98%   BMI 26.69 kg/m²       "

## 2024-03-15 NOTE — OP NOTE
Colorectal & General Surgery  Operative Report    Patient: Herminio Schneider  YOB: 1980  MRN: [id number]  DATE OF PROCEDURE: 03/15/24     PREOPERATIVE DIAGNOSIS:  Colorectal cancer screening    POSTOPERATIVE DIAGNOSIS:  Ascending colon polyp    PROCEDURE:  Colonoscopy to cecum with cold snare polypectomy of ascending colon polyp    FINDINGS:  4 mm sessile polyp ascending colon resected completely.  Hemostatic.  Retrieved.    RECOMMENDATIONS:   Await pathology    SURGEON:  Richy Pfeiffer MD    ANESTHESIA:  Monitored anesthesia care    EBL:  None    SPECIMEN:  Ascending colon polyp    OPERATIVE DESCRIPTION:  The patient was brought to the endoscopy suite under the care of the nursing staff.  A preoperative timeout was performed.  Monitored anesthesia care was initiated.  A digital rectal examination was performed.  The endoscope was inserted into the anus and advanced carefully to the cecum.  The endoscope was then withdrawn and the entire mucosal surface of the colon and rectum were visualized.  There is a ascending colon polyp that was resected completely with cold snare polypectomy.  Hemostatic.  Retrieved.  Retroflexion was performed in the rectum.  The scope was then withdrawn.    The patient tolerated the procedure well and was transferred to the postanesthesia care unit in stable condition.    Richy Pfeiffer M.D.  Colorectal & General Surgery  [facility]    [address], [address]  [place], [place], [address]  P: [phone]  F: [phone]

## 2024-03-15 NOTE — ANESTHESIA PREPROCEDURE EVALUATION
Anesthesia Evaluation     no history of anesthetic complications:                Airway   Mallampati: I  TM distance: >3 FB  Neck ROM: full  Dental - normal exam     Pulmonary    (+) a smoker Former, asthma (advair routinely),  Cardiovascular     (+) hypertension, hyperlipidemia  (-) dysrhythmias, angina      Neuro/Psych  GI/Hepatic/Renal/Endo    (+) GERD  (-) liver disease, no renal disease, diabetes    Musculoskeletal     Abdominal    Substance History      OB/GYN          Other                    Anesthesia Plan    ASA 2     MAC     intravenous induction     Anesthetic plan, risks, benefits, and alternatives have been provided, discussed and informed consent has been obtained with: patient.    CODE STATUS:

## 2024-03-15 NOTE — DISCHARGE INSTRUCTIONS
For the next 24 hours patient needs to be with a responsible adult.    For 24 hours DO NOT drive, operate machinery, appliances, drink alcohol, make important decisions or sign legal documents.    Start with a light or bland diet if you are feeling sick to your stomach otherwise advance to regular diet as tolerated.    Follow recommendations on procedure report if provided by your doctor.    Call Dr Pfeiffer for problems [phone]    Problems may include but not limited to: large amounts of bleeding, trouble breathing, repeated vomiting, severe unrelieved pain, fever or chills.

## 2024-03-18 LAB
LAB AP CASE REPORT: NORMAL
PATH REPORT.FINAL DX SPEC: NORMAL
PATH REPORT.GROSS SPEC: NORMAL

## 2024-03-19 ENCOUNTER — TELEPHONE (OUTPATIENT)
Dept: SURGERY | Facility: CLINIC | Age: 44
End: 2024-03-19
Payer: COMMERCIAL

## 2024-03-19 NOTE — TELEPHONE ENCOUNTER
I called and about his colonoscopy report.    1 polyp.  Tubular adenoma.  Explained that this is a precancerous lesion.  Recommended repeat colonoscopy in 5 years.  Colonoscopy recall placed.  All questions answered.

## 2024-05-06 RX ORDER — OMEPRAZOLE 20 MG/1
20 CAPSULE, DELAYED RELEASE ORAL DAILY
Qty: 30 CAPSULE | Refills: 1 | Status: SHIPPED | OUTPATIENT
Start: 2024-05-06

## 2024-05-06 RX ORDER — DICLOFENAC SODIUM 75 MG/1
75 TABLET, DELAYED RELEASE ORAL 2 TIMES DAILY PRN
Qty: 60 TABLET | Refills: 1 | Status: SHIPPED | OUTPATIENT
Start: 2024-05-06

## 2024-05-06 RX ORDER — METHOCARBAMOL 750 MG/1
750 TABLET, FILM COATED ORAL 3 TIMES DAILY
Qty: 90 TABLET | Refills: 1 | Status: SHIPPED | OUTPATIENT
Start: 2024-05-06

## 2024-07-01 RX ORDER — METHOCARBAMOL 750 MG/1
750 TABLET, FILM COATED ORAL 3 TIMES DAILY
Qty: 90 TABLET | Refills: 0 | Status: SHIPPED | OUTPATIENT
Start: 2024-07-01

## 2024-07-01 RX ORDER — OMEPRAZOLE 20 MG/1
20 CAPSULE, DELAYED RELEASE ORAL DAILY
Qty: 30 CAPSULE | Refills: 0 | Status: SHIPPED | OUTPATIENT
Start: 2024-07-01

## 2024-07-01 RX ORDER — DICLOFENAC SODIUM 75 MG/1
75 TABLET, DELAYED RELEASE ORAL 2 TIMES DAILY PRN
Qty: 60 TABLET | Refills: 0 | Status: SHIPPED | OUTPATIENT
Start: 2024-07-01

## 2024-07-17 DIAGNOSIS — I10 BENIGN ESSENTIAL HYPERTENSION: ICD-10-CM

## 2024-07-17 RX ORDER — LISINOPRIL 20 MG/1
20 TABLET ORAL DAILY
Qty: 30 TABLET | Refills: 0 | Status: SHIPPED | OUTPATIENT
Start: 2024-07-17

## 2024-07-17 RX ORDER — ATORVASTATIN CALCIUM 40 MG/1
40 TABLET, FILM COATED ORAL DAILY
Qty: 30 TABLET | Refills: 0 | Status: SHIPPED | OUTPATIENT
Start: 2024-07-17

## 2024-07-30 ENCOUNTER — OFFICE VISIT (OUTPATIENT)
Dept: FAMILY MEDICINE CLINIC | Facility: CLINIC | Age: 44
End: 2024-07-30
Payer: COMMERCIAL

## 2024-07-30 VITALS
OXYGEN SATURATION: 99 % | RESPIRATION RATE: 18 BRPM | WEIGHT: 186 LBS | DIASTOLIC BLOOD PRESSURE: 72 MMHG | SYSTOLIC BLOOD PRESSURE: 116 MMHG | BODY MASS INDEX: 26.63 KG/M2 | HEIGHT: 70 IN | TEMPERATURE: 96.9 F | HEART RATE: 55 BPM

## 2024-07-30 DIAGNOSIS — E78.2 MIXED HYPERLIPIDEMIA: Primary | ICD-10-CM

## 2024-07-30 DIAGNOSIS — F41.9 ANXIETY: ICD-10-CM

## 2024-07-30 DIAGNOSIS — Z13.0 SCREENING FOR DEFICIENCY ANEMIA: ICD-10-CM

## 2024-07-30 DIAGNOSIS — I10 BENIGN ESSENTIAL HYPERTENSION: ICD-10-CM

## 2024-07-30 DIAGNOSIS — R53.83 OTHER FATIGUE: ICD-10-CM

## 2024-07-30 DIAGNOSIS — E55.9 VITAMIN D DEFICIENCY: ICD-10-CM

## 2024-07-30 PROCEDURE — 99214 OFFICE O/P EST MOD 30 MIN: CPT | Performed by: NURSE PRACTITIONER

## 2024-07-30 RX ORDER — FLUOXETINE HYDROCHLORIDE 20 MG/1
20 CAPSULE ORAL DAILY
Qty: 30 CAPSULE | Refills: 2 | Status: SHIPPED | OUTPATIENT
Start: 2024-07-30

## 2024-07-30 NOTE — PROGRESS NOTES
"Chief Complaint  Follow-up    Subjective        Herminio Schneider presents to [facility]  History of Present Illness  This is a 43-year-old male patient here today for follow-up.  He has a history of hyperlipidemia, hypertension, anxiety, GERD.  Blood pressure stable with lisinopril.  He denies any chest pain or shortness of breath.  He is on Lipitor for hyperlipidemia and is tolerating well.  He is avoiding fatty foods and continues to workout.  He does see Dr. Mayers with urology for ED and family history of prostate cancer and is on Cialis.  He does have GERD and takes omeprazole.  He is avoiding trigger foods.  He continues to struggle with anxiety.  He has seen therapists that he reports did help but has not seen her in a while.  He does struggle with worrying about things.  He denies any suicidal thoughts.         Objective   Vital Signs:  /72   Pulse 55   Temp 96.9 °F (36.1 °C)   Resp 18   Ht 177.8 cm (70\")   Wt 84.4 kg (186 lb)   SpO2 99%   BMI 26.69 kg/m²   Estimated body mass index is 26.69 kg/m² as calculated from the following:    Height as of this encounter: 177.8 cm (70\").    Weight as of this encounter: 84.4 kg (186 lb).               Physical Exam  Vitals and nursing note reviewed.   HENT:      Head: Normocephalic.      Nose: Nose normal.   Eyes:      Pupils: Pupils are equal, round, and reactive to light.   Cardiovascular:      Rate and Rhythm: Normal rate and regular rhythm.      Pulses: Normal pulses.      Heart sounds: Normal heart sounds.   Pulmonary:      Effort: Pulmonary effort is normal. No respiratory distress.      Breath sounds: Normal breath sounds. No wheezing or rales.   Abdominal:      General: Bowel sounds are normal. There is no distension.      Tenderness: There is no abdominal tenderness.   Musculoskeletal:         General: No swelling.      Cervical back: Neck supple.      Right lower leg: No edema.      Left lower leg: No edema.   Skin:   "   General: Skin is warm and dry.   Neurological:      Mental Status: He is alert and oriented to person, place, and time.   Psychiatric:         Mood and Affect: Mood normal.        Result Review :                     Assessment and Plan     Diagnoses and all orders for this visit:    1. Mixed hyperlipidemia (Primary)  -     Comprehensive Metabolic Panel  -     Lipid Panel    2. Screening for deficiency anemia  -     CBC & Differential    3. Benign essential hypertension  -     Urinalysis With Culture If Indicated - Urine, Clean Catch    4. Other fatigue  -     Testosterone, Free, Total    5. Vitamin D deficiency  -     Vitamin D 25 hydroxy    6. Anxiety    Other orders  -     FLUoxetine (PROzac) 20 MG capsule; Take 1 capsule by mouth Daily.  Dispense: 30 capsule; Refill: 2  -     Microscopic Examination -      He will get fasting labs today.  He would like for me to check his testosterone levels but he will continue to follow his urology.  We have discussed medication for his anxiety and he would like to try Prozac.  He will consider continuing therapy  I did ask for him to follow-up with me in 4 weeks to see how medication is working    I spent a total of 30  minutes with the patient today including face-to-face encounter, reviewing data in the system, coordination of care with the nursing staff as well as consultants, documentation and entering orders.         Follow Up     Return in about 4 weeks (around 8/27/2024).  Patient was given instructions and counseling regarding his condition or for health maintenance advice. Please see specific information pulled into the AVS if appropriate.

## 2024-08-02 LAB
25(OH)D3+25(OH)D2 SERPL-MCNC: 68.1 NG/ML (ref 30–100)
ALBUMIN SERPL-MCNC: 4.6 G/DL (ref 4.1–5.1)
ALP SERPL-CCNC: 48 IU/L (ref 44–121)
ALT SERPL-CCNC: 21 IU/L (ref 0–44)
APPEARANCE UR: CLEAR
AST SERPL-CCNC: 20 IU/L (ref 0–40)
BACTERIA #/AREA URNS HPF: NORMAL /[HPF]
BASOPHILS # BLD AUTO: 0 X10E3/UL (ref 0–0.2)
BASOPHILS NFR BLD AUTO: 0 %
BILIRUB SERPL-MCNC: 0.6 MG/DL (ref 0–1.2)
BILIRUB UR QL STRIP: NEGATIVE
BUN SERPL-MCNC: 20 MG/DL (ref 6–24)
BUN/CREAT SERPL: 19 (ref 9–20)
CALCIUM SERPL-MCNC: 9.4 MG/DL (ref 8.7–10.2)
CASTS URNS QL MICRO: NORMAL /LPF
CHLORIDE SERPL-SCNC: 101 MMOL/L (ref 96–106)
CHOLEST SERPL-MCNC: 148 MG/DL (ref 100–199)
CO2 SERPL-SCNC: 23 MMOL/L (ref 20–29)
COLOR UR: YELLOW
CREAT SERPL-MCNC: 1.03 MG/DL (ref 0.76–1.27)
EGFRCR SERPLBLD CKD-EPI 2021: 91 ML/MIN/1.73
EOSINOPHIL # BLD AUTO: 0.1 X10E3/UL (ref 0–0.4)
EOSINOPHIL NFR BLD AUTO: 1 %
EPI CELLS #/AREA URNS HPF: NORMAL /HPF (ref 0–10)
ERYTHROCYTE [DISTWIDTH] IN BLOOD BY AUTOMATED COUNT: 11.8 % (ref 11.6–15.4)
GLOBULIN SER CALC-MCNC: 2.4 G/DL (ref 1.5–4.5)
GLUCOSE SERPL-MCNC: 92 MG/DL (ref 70–99)
GLUCOSE UR QL STRIP: NEGATIVE
HCT VFR BLD AUTO: 44.8 % (ref 37.5–51)
HDLC SERPL-MCNC: 54 MG/DL
HGB BLD-MCNC: 14.9 G/DL (ref 13–17.7)
HGB UR QL STRIP: NEGATIVE
IMM GRANULOCYTES # BLD AUTO: 0 X10E3/UL (ref 0–0.1)
IMM GRANULOCYTES NFR BLD AUTO: 0 %
KETONES UR QL STRIP: NEGATIVE
LDLC SERPL CALC-MCNC: 81 MG/DL (ref 0–99)
LEUKOCYTE ESTERASE UR QL STRIP: NEGATIVE
LYMPHOCYTES # BLD AUTO: 1.8 X10E3/UL (ref 0.7–3.1)
LYMPHOCYTES NFR BLD AUTO: 26 %
MCH RBC QN AUTO: 31 PG (ref 26.6–33)
MCHC RBC AUTO-ENTMCNC: 33.3 G/DL (ref 31.5–35.7)
MCV RBC AUTO: 93 FL (ref 79–97)
MICRO URNS: NORMAL
MICRO URNS: NORMAL
MONOCYTES # BLD AUTO: 0.7 X10E3/UL (ref 0.1–0.9)
MONOCYTES NFR BLD AUTO: 10 %
NEUTROPHILS # BLD AUTO: 4.3 X10E3/UL (ref 1.4–7)
NEUTROPHILS NFR BLD AUTO: 63 %
NITRITE UR QL STRIP: NEGATIVE
PH UR STRIP: 6 [PH] (ref 5–7.5)
PLATELET # BLD AUTO: 236 X10E3/UL (ref 150–450)
POTASSIUM SERPL-SCNC: 4.5 MMOL/L (ref 3.5–5.2)
PROT SERPL-MCNC: 7 G/DL (ref 6–8.5)
PROT UR QL STRIP: NEGATIVE
RBC # BLD AUTO: 4.8 X10E6/UL (ref 4.14–5.8)
RBC #/AREA URNS HPF: NORMAL /HPF (ref 0–2)
SODIUM SERPL-SCNC: 139 MMOL/L (ref 134–144)
SP GR UR STRIP: 1.01 (ref 1–1.03)
TESTOST FREE SERPL-MCNC: 6 PG/ML (ref 6.8–21.5)
TESTOST SERPL-MCNC: 639 NG/DL (ref 264–916)
TRIGL SERPL-MCNC: 66 MG/DL (ref 0–149)
URINALYSIS REFLEX: NORMAL
UROBILINOGEN UR STRIP-MCNC: 0.2 MG/DL (ref 0.2–1)
VLDLC SERPL CALC-MCNC: 13 MG/DL (ref 5–40)
WBC # BLD AUTO: 6.8 X10E3/UL (ref 3.4–10.8)
WBC #/AREA URNS HPF: NORMAL /HPF (ref 0–5)

## 2024-08-06 NOTE — PROGRESS NOTES
All labs are in normal range except free testosterone is low at 6. Can address with urology. Continue medications as ordered

## 2024-08-14 DIAGNOSIS — I10 BENIGN ESSENTIAL HYPERTENSION: ICD-10-CM

## 2024-08-14 RX ORDER — LISINOPRIL 20 MG/1
20 TABLET ORAL DAILY
Qty: 90 TABLET | Refills: 1 | Status: SHIPPED | OUTPATIENT
Start: 2024-08-14

## 2024-08-14 RX ORDER — ATORVASTATIN CALCIUM 40 MG/1
40 TABLET, FILM COATED ORAL DAILY
Qty: 90 TABLET | Refills: 1 | Status: SHIPPED | OUTPATIENT
Start: 2024-08-14

## 2024-09-10 ENCOUNTER — TELEMEDICINE (OUTPATIENT)
Dept: FAMILY MEDICINE CLINIC | Facility: CLINIC | Age: 44
End: 2024-09-10
Payer: COMMERCIAL

## 2024-09-10 DIAGNOSIS — F41.9 ANXIETY: Primary | ICD-10-CM

## 2024-09-10 PROCEDURE — 99213 OFFICE O/P EST LOW 20 MIN: CPT | Performed by: NURSE PRACTITIONER

## 2025-01-10 RX ORDER — ATORVASTATIN CALCIUM 40 MG/1
40 TABLET, FILM COATED ORAL DAILY
Qty: 30 TABLET | Refills: 0 | Status: SHIPPED | OUTPATIENT
Start: 2025-01-10

## 2025-03-12 DIAGNOSIS — I10 BENIGN ESSENTIAL HYPERTENSION: ICD-10-CM

## 2025-03-12 RX ORDER — LISINOPRIL 20 MG/1
20 TABLET ORAL DAILY
Qty: 14 TABLET | Refills: 0 | Status: SHIPPED | OUTPATIENT
Start: 2025-03-12

## 2025-03-19 ENCOUNTER — TELEPHONE (OUTPATIENT)
Dept: FAMILY MEDICINE CLINIC | Facility: CLINIC | Age: 45
End: 2025-03-19

## 2025-03-19 NOTE — TELEPHONE ENCOUNTER
He needs f/u for htn, hyperlipidemia, etc so I can address everything at appt. If he wants to try melatonin in the mean time he can try that over the counter.

## 2025-03-19 NOTE — TELEPHONE ENCOUNTER
"  Caller: Herminio Schneider \"FLORY\"    Relationship to patient: Self    Best call back number: [phone]      Patient is needing:     PATIENT CALLING IN STATING HE WOULD LIKE TO APOLOGIZE TO DAYANA DOHERTY FOR MISSING HIS 3.19.25 APPOINTMENT.     HE HAD OVERSLEPT AND IS RESCHEDULED TO 3.31.25.     HE WOULD LIKE HER TO KNOW HE WANTS TO SPEAK ABOUT HIS SLEEPING ISSUES ON THIS APPOINTMENT AS WELL.     STATES HE HAS OVERSLEPT FOR MANY THINGS RECENTLY AND CANNOT FALL ASLEEP AT NIGHT.     PLEASE ADVISE.   "

## 2025-03-24 RX ORDER — ATORVASTATIN CALCIUM 40 MG/1
40 TABLET, FILM COATED ORAL DAILY
Qty: 30 TABLET | Refills: 0 | Status: SHIPPED | OUTPATIENT
Start: 2025-03-24 | End: 2025-03-31 | Stop reason: SDUPTHER

## 2025-03-31 ENCOUNTER — OFFICE VISIT (OUTPATIENT)
Dept: FAMILY MEDICINE CLINIC | Facility: CLINIC | Age: 45
End: 2025-03-31
Payer: COMMERCIAL

## 2025-03-31 VITALS
HEIGHT: 70 IN | WEIGHT: 215.2 LBS | RESPIRATION RATE: 16 BRPM | TEMPERATURE: 98 F | DIASTOLIC BLOOD PRESSURE: 72 MMHG | BODY MASS INDEX: 30.81 KG/M2 | OXYGEN SATURATION: 96 % | HEART RATE: 64 BPM | SYSTOLIC BLOOD PRESSURE: 112 MMHG

## 2025-03-31 DIAGNOSIS — E55.9 VITAMIN D DEFICIENCY: ICD-10-CM

## 2025-03-31 DIAGNOSIS — E78.2 MIXED HYPERLIPIDEMIA: ICD-10-CM

## 2025-03-31 DIAGNOSIS — I10 HYPERTENSION, UNSPECIFIED TYPE: ICD-10-CM

## 2025-03-31 DIAGNOSIS — I10 BENIGN ESSENTIAL HYPERTENSION: Primary | ICD-10-CM

## 2025-03-31 DIAGNOSIS — K21.9 GASTROESOPHAGEAL REFLUX DISEASE, UNSPECIFIED WHETHER ESOPHAGITIS PRESENT: ICD-10-CM

## 2025-03-31 DIAGNOSIS — Z13.0 SCREENING FOR DEFICIENCY ANEMIA: ICD-10-CM

## 2025-03-31 PROCEDURE — 99214 OFFICE O/P EST MOD 30 MIN: CPT | Performed by: NURSE PRACTITIONER

## 2025-03-31 RX ORDER — LISINOPRIL 20 MG/1
20 TABLET ORAL DAILY
Qty: 14 TABLET | Refills: 0 | Status: SHIPPED | OUTPATIENT
Start: 2025-03-31

## 2025-03-31 RX ORDER — TADALAFIL 20 MG/1
TABLET ORAL
COMMUNITY
Start: 2025-02-06

## 2025-03-31 RX ORDER — TRAZODONE HYDROCHLORIDE 50 MG/1
50 TABLET ORAL NIGHTLY
Qty: 30 TABLET | Refills: 5 | Status: SHIPPED | OUTPATIENT
Start: 2025-03-31

## 2025-03-31 RX ORDER — ATORVASTATIN CALCIUM 40 MG/1
40 TABLET, FILM COATED ORAL DAILY
Qty: 30 TABLET | Refills: 0 | Status: SHIPPED | OUTPATIENT
Start: 2025-03-31

## 2025-03-31 NOTE — PROGRESS NOTES
"Chief Complaint  Hyperlipidemia and Insomnia (DOXYLANINE SUCCINATE- Patient states that this works well for him but he is unsure if it is healthy. )    Subjective        Herminio Schneider presents to [facility]  History of Present Illness  This is a 44-year-old male patient here today for follow-up.  He does have a history of hyperlipidemia, hypertension and GERD.  He did stop taking his Prozac for anxiety and is dealing with symptoms on his own.  He he does see a therapist.  He does have difficulty with insomnia.  He does work third shift few days a week and then is back on a regular schedule.  He is currently taking melatonin 10 mg.  Blood pressure is stable with his current regimen.  He denies any chest pain or shortness of breath.  He is following a low-fat diet and is tolerating his atorvastatin.  He does take omeprazole for GERD.  He does report his symptoms have worsened and feels like his throat is tightening up.  No abdominal pain nausea or vomiting.  He does follow urology regularly due to family history of prostate cancer.      Objective   Vital Signs:  /72   Pulse 64   Temp 98 °F (36.7 °C) (Infrared)   Resp 16   Ht 177.8 cm (70\")   Wt 97.6 kg (215 lb 3.2 oz)   SpO2 96%   BMI 30.88 kg/m²   Estimated body mass index is 30.88 kg/m² as calculated from the following:    Height as of this encounter: 177.8 cm (70\").    Weight as of this encounter: 97.6 kg (215 lb 3.2 oz).          Physical Exam  Vitals and nursing note reviewed.   HENT:      Head: Normocephalic.      Nose: Nose normal.   Eyes:      Pupils: Pupils are equal, round, and reactive to light.   Cardiovascular:      Rate and Rhythm: Normal rate and regular rhythm.      Pulses: Normal pulses.      Heart sounds: Normal heart sounds.   Pulmonary:      Effort: Pulmonary effort is normal. No respiratory distress.      Breath sounds: Normal breath sounds. No wheezing or rales.   Abdominal:      General: Bowel sounds " are normal. There is no distension.      Tenderness: There is no abdominal tenderness.   Musculoskeletal:         General: No swelling.      Cervical back: Neck supple.      Right lower leg: No edema.      Left lower leg: No edema.   Skin:     General: Skin is warm and dry.   Neurological:      Mental Status: He is alert and oriented to person, place, and time.   Psychiatric:         Mood and Affect: Mood normal.        Result Review :                Assessment and Plan   Diagnoses and all orders for this visit:    1. Benign essential hypertension (Primary)  -     Urinalysis With Culture If Indicated - Urine, Clean Catch  -     lisinopril (PRINIVIL,ZESTRIL) 20 MG tablet; Take 1 tablet by mouth Daily.  Dispense: 14 tablet; Refill: 0    2. Mixed hyperlipidemia  -     Comprehensive Metabolic Panel  -     Lipid Panel    3. Hypertension, unspecified type    4. Screening for deficiency anemia  -     CBC & Differential    5. Vitamin D deficiency  -     Vitamin D 25 hydroxy    6. Gastroesophageal reflux disease, unspecified whether esophagitis present    Other orders  -     traZODone (DESYREL) 50 MG tablet; Take 1 tablet by mouth Every Night.  Dispense: 30 tablet; Refill: 5  -     atorvastatin (LIPITOR) 40 MG tablet; Take 1 tablet by mouth Daily.  Dispense: 30 tablet; Refill: 0  -     Microscopic Examination -  -     Vitamin D,25-Hydroxy    We discussed the symptoms of insomnia and I will start him on trazodone.  He will decrease caffeine at night and limit electronics etc.  We will obtain fasting blood work today.  Patient has many questions about his overall health and did give him information today of vascular screenings.  Due to his GERD symptoms worsening he would like a referral to GI and I will place that order today.  He will follow-up with me in 6 months or sooner if needed.  We did discuss televisit if insomnia has not improved             Follow Up   Return in about 6 months (around 9/30/2025).  Patient was given  instructions and counseling regarding his condition or for health maintenance advice. Please see specific information pulled into the AVS if appropriate.

## 2025-04-01 LAB
25(OH)D3+25(OH)D2 SERPL-MCNC: 63.2 NG/ML (ref 30–100)
ALBUMIN SERPL-MCNC: 4.3 G/DL (ref 3.5–5.2)
ALBUMIN/GLOB SERPL: 2 G/DL
ALP SERPL-CCNC: 40 U/L (ref 39–117)
ALT SERPL-CCNC: 34 U/L (ref 1–41)
APPEARANCE UR: CLEAR
AST SERPL-CCNC: 26 U/L (ref 1–40)
BACTERIA #/AREA URNS HPF: NORMAL /[HPF]
BASOPHILS # BLD AUTO: 0.05 10*3/MM3 (ref 0–0.2)
BASOPHILS NFR BLD AUTO: 0.8 % (ref 0–1.5)
BILIRUB SERPL-MCNC: 0.4 MG/DL (ref 0–1.2)
BILIRUB UR QL STRIP: NEGATIVE
BUN SERPL-MCNC: 19 MG/DL (ref 6–20)
BUN/CREAT SERPL: 17.4 (ref 7–25)
CALCIUM SERPL-MCNC: 9.1 MG/DL (ref 8.6–10.5)
CASTS URNS QL MICRO: NORMAL /LPF
CHLORIDE SERPL-SCNC: 103 MMOL/L (ref 98–107)
CHOLEST SERPL-MCNC: 148 MG/DL (ref 0–200)
CO2 SERPL-SCNC: 25.5 MMOL/L (ref 22–29)
COLOR UR: YELLOW
CREAT SERPL-MCNC: 1.09 MG/DL (ref 0.76–1.27)
EGFRCR SERPLBLD CKD-EPI 2021: 84.8 ML/MIN/1.73
EOSINOPHIL # BLD AUTO: 0.13 10*3/MM3 (ref 0–0.4)
EOSINOPHIL NFR BLD AUTO: 2.1 % (ref 0.3–6.2)
EPI CELLS #/AREA URNS HPF: NORMAL /HPF (ref 0–10)
ERYTHROCYTE [DISTWIDTH] IN BLOOD BY AUTOMATED COUNT: 11.7 % (ref 12.3–15.4)
GLOBULIN SER CALC-MCNC: 2.2 GM/DL
GLUCOSE SERPL-MCNC: 90 MG/DL (ref 65–99)
GLUCOSE UR QL STRIP: NEGATIVE
HCT VFR BLD AUTO: 45.2 % (ref 37.5–51)
HDLC SERPL-MCNC: 45 MG/DL (ref 40–60)
HGB BLD-MCNC: 14.7 G/DL (ref 13–17.7)
HGB UR QL STRIP: NEGATIVE
IMM GRANULOCYTES # BLD AUTO: 0.01 10*3/MM3 (ref 0–0.05)
IMM GRANULOCYTES NFR BLD AUTO: 0.2 % (ref 0–0.5)
KETONES UR QL STRIP: NEGATIVE
LDLC SERPL CALC-MCNC: 89 MG/DL (ref 0–100)
LEUKOCYTE ESTERASE UR QL STRIP: NEGATIVE
LYMPHOCYTES # BLD AUTO: 1.54 10*3/MM3 (ref 0.7–3.1)
LYMPHOCYTES NFR BLD AUTO: 25.3 % (ref 19.6–45.3)
MCH RBC QN AUTO: 31.3 PG (ref 26.6–33)
MCHC RBC AUTO-ENTMCNC: 32.5 G/DL (ref 31.5–35.7)
MCV RBC AUTO: 96.2 FL (ref 79–97)
MICRO URNS: NORMAL
MICRO URNS: NORMAL
MONOCYTES # BLD AUTO: 0.84 10*3/MM3 (ref 0.1–0.9)
MONOCYTES NFR BLD AUTO: 13.8 % (ref 5–12)
NEUTROPHILS # BLD AUTO: 3.51 10*3/MM3 (ref 1.7–7)
NEUTROPHILS NFR BLD AUTO: 57.8 % (ref 42.7–76)
NITRITE UR QL STRIP: NEGATIVE
NRBC BLD AUTO-RTO: 0 /100 WBC (ref 0–0.2)
PH UR STRIP: 7 [PH] (ref 5–7.5)
PLATELET # BLD AUTO: 229 10*3/MM3 (ref 140–450)
POTASSIUM SERPL-SCNC: 4.5 MMOL/L (ref 3.5–5.2)
PROT SERPL-MCNC: 6.5 G/DL (ref 6–8.5)
PROT UR QL STRIP: NEGATIVE
RBC # BLD AUTO: 4.7 10*6/MM3 (ref 4.14–5.8)
RBC #/AREA URNS HPF: NORMAL /HPF (ref 0–2)
SODIUM SERPL-SCNC: 138 MMOL/L (ref 136–145)
SP GR UR STRIP: 1.02 (ref 1–1.03)
TRIGL SERPL-MCNC: 70 MG/DL (ref 0–150)
URINALYSIS REFLEX: NORMAL
UROBILINOGEN UR STRIP-MCNC: 0.2 MG/DL (ref 0.2–1)
VLDLC SERPL CALC-MCNC: 14 MG/DL (ref 5–40)
WBC # BLD AUTO: 6.08 10*3/MM3 (ref 3.4–10.8)
WBC #/AREA URNS HPF: NORMAL /HPF (ref 0–5)

## 2025-04-22 DIAGNOSIS — I10 BENIGN ESSENTIAL HYPERTENSION: ICD-10-CM

## 2025-04-23 RX ORDER — LISINOPRIL 20 MG/1
20 TABLET ORAL DAILY
Qty: 90 TABLET | Refills: 1 | Status: SHIPPED | OUTPATIENT
Start: 2025-04-23

## 2025-05-07 ENCOUNTER — TELEPHONE (OUTPATIENT)
Dept: FAMILY MEDICINE CLINIC | Facility: CLINIC | Age: 45
End: 2025-05-07

## 2025-05-07 RX ORDER — HYDROXYZINE HYDROCHLORIDE 25 MG/1
25 TABLET, FILM COATED ORAL NIGHTLY PRN
Qty: 90 TABLET | Refills: 1 | Status: SHIPPED | OUTPATIENT
Start: 2025-05-07

## 2025-05-07 NOTE — TELEPHONE ENCOUNTER
"  Caller: Herminio Schneider \"FLORY\"    Relationship: Self    Best call back number: [phone]     What medication are you requesting: SLEEP AID-STARTS WITH THE LETTER H     Have you had these symptoms before:    [x] Yes  [] No    Have you been treated for these symptoms before:   [x] Yes  [] No    If a prescription is needed, what is your preferred pharmacy and phone number: [facility] - [address] - [phone]  - [phone] FX     Additional notes:PATIENT TRIED THE TRAZADONE AND IT DID HELP WITH SLEEP BUT GAVE HIM A MILD CASE OF RESTLESS LEG AND HE WOULD LIKE TO TRY THE OTHER MEDICATION THAT PCP MENTIONED BUT DOESN'T REMEMBER THE NAME OF IT.     HE IS OUT OF TRAZADONE      "

## 2025-05-07 NOTE — TELEPHONE ENCOUNTER
I will send in hydroxyzine. He should start out with 25mg nightly can increase up to 2 tabs at night if needed

## 2025-05-07 NOTE — TELEPHONE ENCOUNTER
I spoke with patient and he has verbalized understanding of how to take the new medication that Jessica is switching him to. Patient did not have any questions - KS

## 2025-05-28 ENCOUNTER — OFFICE VISIT (OUTPATIENT)
Dept: FAMILY MEDICINE CLINIC | Facility: CLINIC | Age: 45
End: 2025-05-28
Payer: COMMERCIAL

## 2025-05-28 VITALS
DIASTOLIC BLOOD PRESSURE: 88 MMHG | OXYGEN SATURATION: 96 % | HEART RATE: 61 BPM | SYSTOLIC BLOOD PRESSURE: 118 MMHG | TEMPERATURE: 98.4 F | BODY MASS INDEX: 30.78 KG/M2 | HEIGHT: 70 IN | RESPIRATION RATE: 16 BRPM | WEIGHT: 215 LBS

## 2025-05-28 DIAGNOSIS — G89.29 CHRONIC FOOT PAIN, LEFT: Primary | ICD-10-CM

## 2025-05-28 DIAGNOSIS — M79.672 CHRONIC FOOT PAIN, LEFT: Primary | ICD-10-CM

## 2025-05-28 PROCEDURE — 99213 OFFICE O/P EST LOW 20 MIN: CPT | Performed by: NURSE PRACTITIONER

## 2025-05-28 NOTE — PROGRESS NOTES
"Chief Complaint  Foot Pain (Left foot and ankle pain - inside of heal. Pain has been going on for months. Injured it while doing Jiu-jitsu. )    Subjective        Herminio Schneider presents to [facility]  History of Present Illness    History of Present Illness  The patient presents for evaluation of left foot pain.    He has been experiencing persistent left foot pain for several months, initially attributing it to a jiu-jitsu injury that would self-resolve. The injury occurred when a fellow athlete rolled onto his foot, a common occurrence in his experience. However, unlike previous instances, the pain has not subsided. He reports that the pain intensifies when weight is applied to the foot, particularly during his work as a  where he stands on concrete for extended periods. The pain is described as being located over the heel and inside the foot. Despite the absence of swelling, he has noticed a change in his gait, often favoring the affected foot. The severity of the pain varies, but it has reached a point where he is reluctant to bear weight on the foot. He has attempted to alleviate the pain through rest and the use of supportive straps, which have provided some relief. He has not sought pharmacological intervention for the pain. He rates the pain as a 4 or 5 on a scale of 10, describing it as a shock or jolt-like sensation.           Objective   Vital Signs:  /88   Pulse 61   Temp 98.4 °F (36.9 °C) (Infrared)   Resp 16   Ht 177.8 cm (70\")   Wt 97.5 kg (215 lb)   SpO2 96%   BMI 30.85 kg/m²   Estimated body mass index is 30.85 kg/m² as calculated from the following:    Height as of this encounter: 177.8 cm (70\").    Weight as of this encounter: 97.5 kg (215 lb).          Physical Exam  Constitutional:       Appearance: Normal appearance.   Musculoskeletal:         General: No tenderness. Normal range of motion.      Right lower leg: No edema.      Left " lower leg: No edema.   Skin:     General: Skin is warm and dry.   Neurological:      Mental Status: He is alert and oriented to person, place, and time.   Psychiatric:         Mood and Affect: Mood normal.         Behavior: Behavior normal.          Physical Exam         Result Review :           Results                Assessment and Plan   There are no diagnoses linked to this encounter.    Assessment & Plan  1. Left foot pain.  - Persistent left foot pain for months, exacerbated by weight-bearing activities such as standing for long periods and performing calf raises.  - Pain described as being over the heel and inside the foot, with no noticeable swelling.  - Discussion included the use of supportive straps, which provide some relief, and the patient's desire to continue training.  - Referral to Dr. Deshpande has been made for further evaluation and management.            Follow Up   No follow-ups on file.  Patient was given instructions and counseling regarding his condition or for health maintenance advice. Please see specific information pulled into the AVS if appropriate.         Patient or patient representative verbalized consent for the use of Ambient Listening during the visit with  BESSY Jain for chart documentation. 5/28/2025  11:49 EDT

## 2025-06-02 RX ORDER — ATORVASTATIN CALCIUM 40 MG/1
40 TABLET, FILM COATED ORAL DAILY
Qty: 90 TABLET | Refills: 1 | Status: SHIPPED | OUTPATIENT
Start: 2025-06-02

## 2025-06-12 ENCOUNTER — OFFICE VISIT (OUTPATIENT)
Dept: ORTHOPEDIC SURGERY | Facility: CLINIC | Age: 45
End: 2025-06-12
Payer: COMMERCIAL

## 2025-06-12 VITALS — WEIGHT: 210.4 LBS | HEIGHT: 70 IN | TEMPERATURE: 97.1 F | BODY MASS INDEX: 30.12 KG/M2

## 2025-06-12 DIAGNOSIS — S93.402A SPRAIN OF LEFT ANKLE, UNSPECIFIED LIGAMENT, INITIAL ENCOUNTER: ICD-10-CM

## 2025-06-12 DIAGNOSIS — R52 PAIN: ICD-10-CM

## 2025-06-12 DIAGNOSIS — M76.822 POSTERIOR TIBIAL TENDONITIS, LEFT: Primary | ICD-10-CM

## 2025-06-12 NOTE — PROGRESS NOTES
"   New Patient Encounter      Patient: Herminio Schneider  YOB: 1980 44 y.o. male  Medical Record Number: [id number]    Chief Complaints: My ankle hurts    History of Present Illness:       Patient saw his PCP for this on 5/28/2025 and per review of those notes evidently sustained injury to his left foot several months prior and attributed it to a jujitsu injury that he thought would resolve.  It occurred when a fellow athlete rolled onto his foot which happens frequently but pain did not subside.  He had had pain had intensified when weight was applied to the foot particular during his work as a  where he stood on concrete for extended periods.  Pain was described as being located over the heel and inside of the foot.  He did notice change in gait often favoring the affected foot with variable severity of pain but had reached the point where he was left in to bear weight.  He had attempted rest and supportive straps which provided some relief but did not have any pharmacologic intervention.  Pain was described as a sharp or dull like sensation.  He was referred here for further evaluation    Patient is seen today reporting that he injured his left inferomedial hindfoot about 6 months ago while practicing [employer] and his partner he thinks rolled onto his ankle inverting it when he had the ankle twisted in.  He reports she has had previous injuries in the ankle but nothing that persisted like this.  Symptoms are persistent especially when he is standing for prolonged period of time as a  and wearing 30 pounds of gear and symptoms with stepping off to a curb and not necessarily twisting.  He has pain when he tries to do nonweighted calf raises with a \"shock feeling in the posterior medial aspect of the ankle.  He reports that he had possible previous injury to the ankle in 2017 while doing a takedown maneuver at the [employer] and members that he wore an active ankle for a " while.  He was without symptoms in the left ankle prior to this most recent injury      Patient is seen today at the request of BESSY Carlton who has requested my opinion regarding etiology and treatment of this condition         HPI    Allergies: No Known Allergies    Medications:   Current Outpatient Medications on File Prior to Visit   Medication Sig    Advair -21 MCG/ACT inhaler Inhale 2 puffs 2 (Two) Times a Day.    atorvastatin (LIPITOR) 40 MG tablet TAKE 1 TABLET BY MOUTH DAILY.    finasteride (PROPECIA) 1 MG tablet Take 1 tablet by mouth Daily.    hydrOXYzine (ATARAX) 25 MG tablet Take 1 tablet by mouth At Night As Needed (insomnia).    lisinopril (PRINIVIL,ZESTRIL) 20 MG tablet TAKE 1 TABLET BY MOUTH DAILY.    melatonin 5 MG tablet tablet Take 2 tablets by mouth At Night As Needed.    omeprazole (priLOSEC) 20 MG capsule TAKE 1 CAPSULE BY MOUTH DAILY.    tadalafil (CIALIS) 20 MG tablet     vitamin D3 125 MCG (5000 UT) capsule capsule     cyclobenzaprine (FLEXERIL) 10 MG tablet Take 1 tablet by mouth 3 (Three) Times a Day As Needed for Muscle Spasms. (Patient not taking: Reported on 6/12/2025)    methocarbamol (ROBAXIN) 750 MG tablet TAKE ONE TABLET BY MOUTH 3 TIMES DAILY (Patient not taking: Reported on 6/12/2025)     No current facility-administered medications on file prior to visit.       Past Medical History:   Diagnosis Date    Ankle sprain Childhood    Anxiety     Arthritis Neck 2010    Asthma     Bulging lumbar disc     Cancer Skin cancer basil cell 2007    Cervical disc disorder 2010    Two herniated doscs in neck    Fracture, finger 2003    Injured right thumb    GERD (gastroesophageal reflux disease)     History of COVID-19     2022    Hyperlipidemia     Hypertension     Low back pain 6 months ago    Had several months of back pain around 5857-9347    Low back strain 2011    While dead lifting    Lumbosacral disc disease 2024    Seen on imaging    Neck strain 2010    Julia marinelli    Rotator  "cuff syndrome     Thoracic disc disorder 22     Past Surgical History:   Procedure Laterality Date    COLONOSCOPY N/A 03/15/2024    Procedure: COLONOSCOPY to cecum with cold snare polypectomy;  Surgeon: Ankit Pfeiffer MD;  Location[facility] ENDOSCOPY;  Service: General;  Laterality: N/A;  Pre: screening   Post: polyp    FINGER/THUMB CLOSED REDUCTION Right     HAND SURGERY      Injured right thumb    LASIK Bilateral     NOSE SURGERY  2007    VASECTOMY       Social History     Occupational History    Not on file   Tobacco Use    Smoking status: Former     Current packs/day: 0.00     Average packs/day: 1 pack/day for 4.0 years (4.0 ttl pk-yrs)     Types: Cigarettes     Start date: 1996     Quit date:      Years since quittin.    Smokeless tobacco: Never   Vaping Use    Vaping status: Never Used   Substance and Sexual Activity    Alcohol use: Not Currently    Drug use: Not Currently     Types: Marijuana     Comment: in high school    Sexual activity: Yes     Partners: Female     Birth control/protection: Vasectomy     Comment: it's been several years ago      Social History     Social History Narrative    Not on file     Family History   Problem Relation Age of Onset    Anxiety disorder Mother     Self-Injurious Behavior  Sister     Depression Sister     Anxiety disorder Sister     Anxiety disorder Maternal Grandmother     Anxiety disorder Father     Arthritis Father     Cancer Father         Prostate    Heart disease Father     Malig Hyperthermia Neg Hx        Review of Systems: 14 point review of systems performed, positive pertinent findings identified in HPI. All remaining systems negative     Review of Systems      Physical Exam:   Vitals:    25 0947   Temp: 97.1 °F (36.2 °C)   Weight: 95.4 kg (210 lb 6.4 oz)   Height: 177.8 cm (70\")   PainSc: 5      Physical Exam   Constitutional: pleasant, well developed   Eyes: sclera non icteric  Hearing : adequate for exam  Cardiovascular: " palpable pulses in left foot, left calf/ thigh NT without sign of DVT  Respiratoy: breathing unlabored   Neurological: grossly sensate to LT throughout left LE  Psychiatric: oriented with normal mood and affect.   Lymphatic: No palpable popliteal lymphadenopathy left LE  Skin: intact throughout left leg/foot  Musculoskeletal: On exam he had moderate tenderness over the inferomedial aspect of the left ankle along the medial deltoid and posterior tib tendon worse resisted inversion.  He had neutral dorsiflexion of the heel cord and no gross instability on anterior drawer or focal tenderness on the peroneal tendons.  Is unable to elicit a clear Tinel's over the medial hindfoot  Physical Exam  Ortho Exam    Radiology: 3 views left foot ordered evaluate pain and reviewed and no prior x-rays available for comparison.  No obvious fracture.  There is a little bit of prominent superior calcaneal tuberosity but nothing dramatic and no insertional calcific calcifications.  There is a small spur over the dorsal proximal aspect of the navicular.  There is mild metatarsus adductus and some narrowing of the first MTP joint.    Assessment/Plan: 1.  Left persistent inferomedial hindfoot pain with possible posterior tib tendon tear and/or deltoid injury with previous history of possible lateral ankle or peroneal tendon injury    We discussed treatment going forward has had persistent symptoms despite conservative measures as outlined above.  He was fitted with an ASO brace to help provide stability to the ankle and will get an MRI to evaluate for posterior tib tendon tear deltoid injury or occult fracture to help tailor treatment protocol.    Will see him back in about 3 weeks to review MRI assess progress and discuss treatment going forward.

## 2025-06-17 ENCOUNTER — OFFICE VISIT (OUTPATIENT)
Dept: GASTROENTEROLOGY | Facility: CLINIC | Age: 45
End: 2025-06-17
Payer: COMMERCIAL

## 2025-06-17 ENCOUNTER — PREP FOR SURGERY (OUTPATIENT)
Dept: SURGERY | Facility: SURGERY CENTER | Age: 45
End: 2025-06-17
Payer: COMMERCIAL

## 2025-06-17 ENCOUNTER — TRANSCRIBE ORDERS (OUTPATIENT)
Dept: ADMINISTRATIVE | Facility: HOSPITAL | Age: 45
End: 2025-06-17
Payer: COMMERCIAL

## 2025-06-17 VITALS
WEIGHT: 207.4 LBS | SYSTOLIC BLOOD PRESSURE: 110 MMHG | HEIGHT: 70 IN | OXYGEN SATURATION: 97 % | TEMPERATURE: 97.8 F | HEART RATE: 73 BPM | DIASTOLIC BLOOD PRESSURE: 78 MMHG | BODY MASS INDEX: 29.69 KG/M2

## 2025-06-17 DIAGNOSIS — R13.10 DYSPHAGIA, UNSPECIFIED TYPE: Primary | Chronic | ICD-10-CM

## 2025-06-17 DIAGNOSIS — R13.10 DYSPHAGIA, UNSPECIFIED TYPE: Primary | ICD-10-CM

## 2025-06-17 DIAGNOSIS — K21.9 GASTROESOPHAGEAL REFLUX DISEASE, UNSPECIFIED WHETHER ESOPHAGITIS PRESENT: Chronic | ICD-10-CM

## 2025-06-17 DIAGNOSIS — Z86.0100 HISTORY OF COLON POLYPS: Chronic | ICD-10-CM

## 2025-06-17 DIAGNOSIS — K21.9 GASTROESOPHAGEAL REFLUX DISEASE, UNSPECIFIED WHETHER ESOPHAGITIS PRESENT: ICD-10-CM

## 2025-06-17 DIAGNOSIS — R93.1 ABNORMAL SCREENING CARDIAC CT: Primary | ICD-10-CM

## 2025-06-17 PROCEDURE — 99214 OFFICE O/P EST MOD 30 MIN: CPT | Performed by: NURSE PRACTITIONER

## 2025-06-17 RX ORDER — SODIUM CHLORIDE 0.9 % (FLUSH) 0.9 %
10 SYRINGE (ML) INJECTION AS NEEDED
OUTPATIENT
Start: 2025-06-17

## 2025-06-17 RX ORDER — OMEPRAZOLE 20 MG/1
20 CAPSULE, DELAYED RELEASE ORAL DAILY
Qty: 90 CAPSULE | Refills: 1 | Status: SHIPPED | OUTPATIENT
Start: 2025-06-17

## 2025-06-17 RX ORDER — SODIUM CHLORIDE 0.9 % (FLUSH) 0.9 %
3 SYRINGE (ML) INJECTION EVERY 12 HOURS SCHEDULED
OUTPATIENT
Start: 2025-06-17

## 2025-06-17 NOTE — PROGRESS NOTES
"Chief Complaint   Patient presents with    [facility]    Heartburn    Difficulty Swallowing           History of Present Illness  History of Present Illness  The patient presents for evaluation of dysphagia.  He has history of GERD and colon polyps.    He reports chronic GERD since around age 18 and has been on omeprazole 20 mg daily for 25+ years.  This typically manages his acid reflux and heartburn.  He drinks a lot of black coffee, if he drinks too much caffeine he can feel some dyspepsia.  He also feels symptoms if he eats tomato sauce.  Alcohol used to be a trigger but he denies any alcohol use in the past 4 years. Over the past couple years he reports new trouble swallowing with food going down slowly.  Denies regurgitation.  Trigger foods include bread and dry foods.  No other GI complaints reported.  States he had an EGD 10+ years ago, cannot remember results.     Result Review :      Referral to Gastroenterology for Gastroesophageal reflux disease, unspecified whether esophagitis present (04/02/2025)     Colonoscopy (03/15/2024 14:58) TA polyp.  5-year recall due 2029.  Op Note by Ankit Pfeifefr MD (03/15/2024 15:01)   Tissue Pathology Exam (03/15/2024 15:18)     Medication list reviewed        Review of Systems   Constitutional: Negative.    HENT: Negative.     Eyes: Negative.    Respiratory: Negative.     Cardiovascular: Negative.    Gastrointestinal: Negative.         Trouble swallowing   Endocrine: Negative.    Genitourinary: Negative.    Musculoskeletal: Negative.    Skin: Negative.    Allergic/Immunologic: Negative.    Neurological: Negative.    Hematological: Negative.    Psychiatric/Behavioral: Negative.           Vital Signs:   /78   Pulse 73   Temp 97.8 °F (36.6 °C)   Ht 177.8 cm (70\")   Wt 94.1 kg (207 lb 6.4 oz)   SpO2 97%   BMI 29.76 kg/m²     Body mass index is 29.76 kg/m².     Physical Exam  Constitutional:       Appearance: Normal appearance.   HENT:      Head: " Normocephalic and atraumatic.      Nose: Nose normal.   Eyes:      Extraocular Movements: Extraocular movements intact.      Conjunctiva/sclera: Conjunctivae normal.      Pupils: Pupils are equal, round, and reactive to light.   Cardiovascular:      Rate and Rhythm: Normal rate.   Pulmonary:      Effort: Pulmonary effort is normal.   Musculoskeletal:      Cervical back: Normal range of motion.   Neurological:      General: No focal deficit present.      Mental Status: He is alert and oriented to person, place, and time.   Psychiatric:         Mood and Affect: Mood normal.         Behavior: Behavior normal.         Thought Content: Thought content normal.         Judgment: Judgment normal.             Assessment and Plan    Diagnoses and all orders for this visit:    1. Dysphagia, unspecified type (Primary)    2. Gastroesophageal reflux disease, unspecified whether esophagitis present    3. History of colon polyps           Discussion:  Patient with history of chronic GERD presents with complaint of trouble swallowing.  Recommend EGD with possible dilation.  Continue omeprazole 20 mg daily for now, may adjust antacid regimen pending EGD results.    Return if symptoms worsen or fail to improve.       Patient Instructions   Schedule EGD for further evaluation, orders placed.  Additional recommendations will be made based on EGD findings.     Continue Omeprazole 20 mg daily - we may make adjustments based on scope findings    Your next colonoscopy will be due in 2029    Some modifications to help while you are having trouble swallowing include:   Avoiding highly textured foods such as meats and bulky foods such as bagels and breads.   Cutting food into smaller pieces.   Extensively chew foods.   Washing foods down with liquids and eating more slowly.  Also purposefully eating which includes avoiding distractions or talking while focusing on chewing completely and purposefully.           Patient or patient representative  verbalized consent for the use of Ambient Listening during the visit with  BESSY Harrison for chart documentation. 6/17/2025  09:38 EDT            BESSY Gonzalez  [facility] [address] [address]  Office: [phone]

## 2025-06-17 NOTE — PATIENT INSTRUCTIONS
Schedule EGD for further evaluation, orders placed.  Additional recommendations will be made based on EGD findings.     Continue Omeprazole 20 mg daily - we may make adjustments based on scope findings    Your next colonoscopy will be due in 2029    Some modifications to help while you are having trouble swallowing include:   Avoiding highly textured foods such as meats and bulky foods such as bagels and breads.   Cutting food into smaller pieces.   Extensively chew foods.   Washing foods down with liquids and eating more slowly.  Also purposefully eating which includes avoiding distractions or talking while focusing on chewing completely and purposefully.     190

## 2025-06-17 NOTE — H&P (VIEW-ONLY)
"Chief Complaint   Patient presents with    [facility] Care    Heartburn    Difficulty Swallowing           History of Present Illness  History of Present Illness  The patient presents for evaluation of dysphagia.  He has history of GERD and colon polyps.    He reports chronic GERD since around age 18 and has been on omeprazole 20 mg daily for 25+ years.  This typically manages his acid reflux and heartburn.  He drinks a lot of black coffee, if he drinks too much caffeine he can feel some dyspepsia.  He also feels symptoms if he eats tomato sauce.  Alcohol used to be a trigger but he denies any alcohol use in the past 4 years. Over the past couple years he reports new trouble swallowing with food going down slowly.  Denies regurgitation.  Trigger foods include bread and dry foods.  No other GI complaints reported.  States he had an EGD 10+ years ago, cannot remember results.     Result Review :      Referral to Gastroenterology for Gastroesophageal reflux disease, unspecified whether esophagitis present (04/02/2025)     Colonoscopy (03/15/2024 14:58) TA polyp.  5-year recall due 2029.  Op Note by Ankit Pfeiffer MD (03/15/2024 15:01)   Tissue Pathology Exam (03/15/2024 15:18)     Medication list reviewed        Review of Systems   Constitutional: Negative.    HENT: Negative.     Eyes: Negative.    Respiratory: Negative.     Cardiovascular: Negative.    Gastrointestinal: Negative.         Trouble swallowing   Endocrine: Negative.    Genitourinary: Negative.    Musculoskeletal: Negative.    Skin: Negative.    Allergic/Immunologic: Negative.    Neurological: Negative.    Hematological: Negative.    Psychiatric/Behavioral: Negative.           Vital Signs:   /78   Pulse 73   Temp 97.8 °F (36.6 °C)   Ht 177.8 cm (70\")   Wt 94.1 kg (207 lb 6.4 oz)   SpO2 97%   BMI 29.76 kg/m²     Body mass index is 29.76 kg/m².     Physical Exam  Constitutional:       Appearance: Normal appearance.   HENT:      Head: " Normocephalic and atraumatic.      Nose: Nose normal.   Eyes:      Extraocular Movements: Extraocular movements intact.      Conjunctiva/sclera: Conjunctivae normal.      Pupils: Pupils are equal, round, and reactive to light.   Cardiovascular:      Rate and Rhythm: Normal rate.   Pulmonary:      Effort: Pulmonary effort is normal.   Musculoskeletal:      Cervical back: Normal range of motion.   Neurological:      General: No focal deficit present.      Mental Status: He is alert and oriented to person, place, and time.   Psychiatric:         Mood and Affect: Mood normal.         Behavior: Behavior normal.         Thought Content: Thought content normal.         Judgment: Judgment normal.             Assessment and Plan    Diagnoses and all orders for this visit:    1. Dysphagia, unspecified type (Primary)    2. Gastroesophageal reflux disease, unspecified whether esophagitis present    3. History of colon polyps           Discussion:  Patient with history of chronic GERD presents with complaint of trouble swallowing.  Recommend EGD with possible dilation.  Continue omeprazole 20 mg daily for now, may adjust antacid regimen pending EGD results.    Return if symptoms worsen or fail to improve.       Patient Instructions   Schedule EGD for further evaluation, orders placed.  Additional recommendations will be made based on EGD findings.     Continue Omeprazole 20 mg daily - we may make adjustments based on scope findings    Your next colonoscopy will be due in 2029    Some modifications to help while you are having trouble swallowing include:   Avoiding highly textured foods such as meats and bulky foods such as bagels and breads.   Cutting food into smaller pieces.   Extensively chew foods.   Washing foods down with liquids and eating more slowly.  Also purposefully eating which includes avoiding distractions or talking while focusing on chewing completely and purposefully.           Patient or patient representative  verbalized consent for the use of Ambient Listening during the visit with  BESSY Harrison for chart documentation. 6/17/2025  09:38 EDT            BESSY Gonzalez  [facility] [address] [address]  Office: [phone]

## 2025-07-07 ENCOUNTER — HOSPITAL ENCOUNTER (OUTPATIENT)
Facility: SURGERY CENTER | Age: 45
Setting detail: HOSPITAL OUTPATIENT SURGERY
Discharge: HOME | End: 2025-07-07
Attending: INTERNAL MEDICINE | Admitting: INTERNAL MEDICINE
Payer: COMMERCIAL

## 2025-07-07 ENCOUNTER — ANESTHESIA EVENT (OUTPATIENT)
Dept: SURGERY | Facility: SURGERY CENTER | Age: 45
End: 2025-07-07
Payer: COMMERCIAL

## 2025-07-07 ENCOUNTER — ANESTHESIA (OUTPATIENT)
Dept: SURGERY | Facility: SURGERY CENTER | Age: 45
End: 2025-07-07
Payer: COMMERCIAL

## 2025-07-07 ENCOUNTER — TELEPHONE (OUTPATIENT)
Dept: FAMILY MEDICINE CLINIC | Facility: CLINIC | Age: 45
End: 2025-07-07

## 2025-07-07 VITALS
HEART RATE: 73 BPM | OXYGEN SATURATION: 96 % | BODY MASS INDEX: 29.12 KG/M2 | RESPIRATION RATE: 16 BRPM | HEIGHT: 70 IN | DIASTOLIC BLOOD PRESSURE: 72 MMHG | SYSTOLIC BLOOD PRESSURE: 122 MMHG | WEIGHT: 203.4 LBS | TEMPERATURE: 98.6 F

## 2025-07-07 DIAGNOSIS — R13.10 DYSPHAGIA, UNSPECIFIED TYPE: ICD-10-CM

## 2025-07-07 DIAGNOSIS — K21.9 GASTROESOPHAGEAL REFLUX DISEASE, UNSPECIFIED WHETHER ESOPHAGITIS PRESENT: ICD-10-CM

## 2025-07-07 PROCEDURE — 43239 EGD BIOPSY SINGLE/MULTIPLE: CPT | Performed by: INTERNAL MEDICINE

## 2025-07-07 PROCEDURE — 25010000002 PROPOFOL 10 MG/ML EMULSION: Performed by: ANESTHESIOLOGY

## 2025-07-07 PROCEDURE — 25010000002 LIDOCAINE 1 % SOLUTION: Performed by: ANESTHESIOLOGY

## 2025-07-07 PROCEDURE — 43239 EGD BIOPSY SINGLE/MULTIPLE: CPT | Performed by: NURSE PRACTITIONER

## 2025-07-07 PROCEDURE — 88305 TISSUE EXAM BY PATHOLOGIST: CPT | Performed by: INTERNAL MEDICINE

## 2025-07-07 PROCEDURE — 25810000003 LACTATED RINGERS PER 1000 ML: Performed by: INTERNAL MEDICINE

## 2025-07-07 RX ORDER — SODIUM CHLORIDE 0.9 % (FLUSH) 0.9 %
10 SYRINGE (ML) INJECTION AS NEEDED
Status: DISCONTINUED | OUTPATIENT
Start: 2025-07-07 | End: 2025-07-07 | Stop reason: HOSPADM

## 2025-07-07 RX ORDER — LIDOCAINE HYDROCHLORIDE 10 MG/ML
INJECTION, SOLUTION INFILTRATION; PERINEURAL AS NEEDED
Status: DISCONTINUED | OUTPATIENT
Start: 2025-07-07 | End: 2025-07-07 | Stop reason: SURG

## 2025-07-07 RX ORDER — SODIUM CHLORIDE 0.9 % (FLUSH) 0.9 %
3 SYRINGE (ML) INJECTION EVERY 12 HOURS SCHEDULED
Status: DISCONTINUED | OUTPATIENT
Start: 2025-07-07 | End: 2025-07-07 | Stop reason: HOSPADM

## 2025-07-07 RX ORDER — SODIUM CHLORIDE, SODIUM LACTATE, POTASSIUM CHLORIDE, CALCIUM CHLORIDE 600; 310; 30; 20 MG/100ML; MG/100ML; MG/100ML; MG/100ML
500 INJECTION, SOLUTION INTRAVENOUS CONTINUOUS
Status: DISCONTINUED | OUTPATIENT
Start: 2025-07-07 | End: 2025-07-07 | Stop reason: HOSPADM

## 2025-07-07 RX ORDER — LIDOCAINE HYDROCHLORIDE 10 MG/ML
0.5 INJECTION, SOLUTION INFILTRATION; PERINEURAL ONCE AS NEEDED
Status: DISCONTINUED | OUTPATIENT
Start: 2025-07-07 | End: 2025-07-07 | Stop reason: HOSPADM

## 2025-07-07 RX ADMIN — PROPOFOL 100 MCG/KG/MIN: 10 INJECTION, EMULSION INTRAVENOUS at 08:56

## 2025-07-07 RX ADMIN — SODIUM CHLORIDE, SODIUM LACTATE, POTASSIUM CHLORIDE, CALCIUM CHLORIDE 500 ML: 20; 30; 600; 310 INJECTION, SOLUTION INTRAVENOUS at 08:48

## 2025-07-07 RX ADMIN — LIDOCAINE HYDROCHLORIDE 60 MG: 10 INJECTION, SOLUTION INFILTRATION; PERINEURAL at 08:58

## 2025-07-07 NOTE — ANESTHESIA POSTPROCEDURE EVALUATION
"Patient: Herminio Schneider    Procedure Summary       Date: 07/07/25 Room / Location: [facility] OR 06 / [facility]    Anesthesia Start: 0855 Anesthesia Stop: 0912    Procedure: ESOPHAGOGASTRODUODENOSCOPY Diagnosis:       Dysphagia, unspecified type      Gastroesophageal reflux disease, unspecified whether esophagitis present      (Dysphagia, unspecified type [R13.10])      (Gastroesophageal reflux disease, unspecified whether esophagitis present [K21.9])    Surgeons: Jorge Olson MD Provider: Juan Miguel Mirza MD    Anesthesia Type: MAC ASA Status: 2            Anesthesia Type: MAC    Vitals  Vitals Value Taken Time   /72 07/07/25 09:32   Temp 37 °C (98.6 °F) 07/07/25 09:09   Pulse 55 07/07/25 09:32   Resp 16 07/07/25 09:27   SpO2 94 % 07/07/25 09:32   Vitals shown include unfiled device data.        Post Anesthesia Care and Evaluation    Patient location during evaluation: bedside  Level of consciousness: awake  Pain management: adequate    Airway patency: patent    Cardiovascular status: acceptable  Respiratory status: acceptable    Comments: /72   Pulse 73   Temp 37 °C (98.6 °F) (Temporal)   Resp 16   Ht 177.8 cm (70\")   Wt 92.3 kg (203 lb 6.4 oz)   SpO2 96%   BMI 29.18 kg/m²       "

## 2025-07-07 NOTE — ANESTHESIA PREPROCEDURE EVALUATION
Anesthesia Evaluation     Patient summary reviewed   NPO Solid Status: > 8 hours             Airway   Mallampati: II  Dental - normal exam     Pulmonary    (+) asthma,  Cardiovascular     (+) hypertension, hyperlipidemia      Neuro/Psych  (+) psychiatric history  GI/Hepatic/Renal/Endo    (+) GERD    Musculoskeletal     Abdominal    Substance History      OB/GYN          Other   arthritis,                 Anesthesia Plan    ASA 2     MAC       Anesthetic plan, risks, benefits, and alternatives have been provided, discussed and informed consent has been obtained with: patient.    CODE STATUS:

## 2025-07-09 LAB
CYTO UR: NORMAL
LAB AP CASE REPORT: NORMAL
PATH REPORT.FINAL DX SPEC: NORMAL
PATH REPORT.GROSS SPEC: NORMAL

## 2025-07-16 DIAGNOSIS — E78.2 MIXED HYPERLIPIDEMIA: Primary | ICD-10-CM

## 2025-07-21 ENCOUNTER — HOSPITAL ENCOUNTER (OUTPATIENT)
Dept: MRI IMAGING | Facility: HOSPITAL | Age: 45
Discharge: HOME | End: 2025-07-21
Admitting: ORTHOPAEDIC SURGERY
Payer: COMMERCIAL

## 2025-07-21 DIAGNOSIS — M76.822 POSTERIOR TIBIAL TENDONITIS, LEFT: ICD-10-CM

## 2025-07-21 DIAGNOSIS — S93.402A SPRAIN OF LEFT ANKLE, UNSPECIFIED LIGAMENT, INITIAL ENCOUNTER: ICD-10-CM

## 2025-07-21 PROCEDURE — 73721 MRI JNT OF LWR EXTRE W/O DYE: CPT

## 2025-07-24 PROBLEM — M95.8 OSTEOCHONDRAL DEFECT OF TALUS: Status: ACTIVE | Noted: 2025-07-24

## 2025-07-24 NOTE — PROGRESS NOTES
"Ankle Follow Up      Patient: Herminio Schneider    YOB: 1980 44 y.o. male    Chief Complaints: Ankle is \"pretty good\"    History of Present Illness:Patient saw his PCP for this on 5/28/2025 and per review of those notes evidently sustained injury to his left foot several months prior and attributed it to a jujitsu injury that he thought would resolve.  It occurred when a fellow athlete rolled onto his foot which happens frequently but pain did not subside.  He had had pain had intensified when weight was applied to the foot particular during his work as a  where he stood on concrete for extended periods.  Pain was described as being located over the heel and inside of the foot.  He did notice change in gait often favoring the affected foot with variable severity of pain but had reached the point where he was difficult to bear weight.  He had attempted rest and supportive straps which provided some relief but did not have any pharmacologic intervention.  Pain was described as a sharp or dull like sensation.  He was referred here for further evaluation     Patient was seen on 6/12/2025 reporting that he injured his left inferomedial hindfoot about 6 months prior while practicing [employer] and he thought that his partner  rolled onto his ankle inverting it when he had the ankle twisted in.  He reported that he had had previous injuries in the ankle but nothing that persisted like this.  Symptoms were persistent especially when he was standing for prolonged period of time as a  and wearing 30 pounds of gear and.  Symptoms with stepping off to a curb and not necessarily twisting.  He reported pain when he tries to do nonweighted calf raises with a \"shock \"feeling in the posterior medial aspect of the ankle.  He reported that he had possible previous injury to the ankle in 2017 while doing a takedown maneuver at the [email or web address] and remembered that he wore an active ankle for a " "while.  He was however without symptoms in the left ankle prior to this most recent injury     We discussed treatment and he had had persistent symptoms despite conservative measures as outlined previously.  He was fitted with an ASO brace to help provide stability to the ankle and sent for MRI for further evaluation.  I was concerned about persistent inferomedial hindfoot pain with possible posterior tib tendon tear and/or deltoid injury with history of previous possible lateral ankle and/or peroneal tendon injury    Patient is seen back today reporting that his ankle is \"pretty good\".  Did have some swelling with mowing the grass.  Still reports some intermittent shooting pain.  Symptoms not caused him to limp but he has been being cautious.  Pain is mainly in the posterior medial aspect of the ankle and feels like it is \"deep\".  Pain is rated at 1 out of 10           HPI    ROS: ankle pain  Past Medical History:   Diagnosis Date   • Ankle sprain Childhood   • Anxiety    • Arthritis Neck 2010   • Asthma    • Bulging lumbar disc    • Cancer Skin cancer basil cell 2007   • Cervical disc disorder 2010    Two herniated doscs in neck   • Colon polyp    • Fracture, finger 2003    Injured right thumb   • GERD (gastroesophageal reflux disease)    • History of COVID-19     2022   • Hyperlipidemia    • Hypertension    • Low back pain 6 months ago    Had several months of back pain around [id number]   • Low back strain 2011    While dead lifting   • Lumbosacral disc disease 2024    Seen on imaging   • Neck strain 2010    Julia marinelli   • Rotator cuff syndrome    • Thoracic disc disorder 1-31-24       Physical Exam:   Vitals:    07/28/25 0857   Temp: 97.8 °F (36.6 °C)   Weight: 94.3 kg (207 lb 12.8 oz)   Height: 177.8 cm (70\")   PainSc: 1      Well developed with normal mood.  Exam he did not have pain with range of motion ankle but did have some deep pain in the posterior medial aspect of the ankle to palpation a decent " inversion strength      Radiology: MRI films report of the left ankle dated 7/21/2025 reviewed which show an osteochondral lesion of the medial talar dome with there is articular cartilage loss and mild subchondral cystic change as well as underlying sclerosis and edema.  There is no fluid undercutting or displaced fragment    The medial and lateral ankle ligament structures and posterior tib tendon were intact as were the peroneal tendons.  There was increased signal within the posterior tib tendon insertion consistent with tendinopathy.  MRI was otherwise unremarkable.      Assessment/Plan: 1.  Left posterior tib tendinopathy without evidence of tear or evidence of medial or lateral ankle ligament or peroneal tendon injury  2.  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  He was prescribed compounding cream to apply several times daily and we will get him started in some physical therapy.  Advised him against activities such as jujitsu    We discussed other treatment options and after verbal consent zoster preparation with discussion of risks which can include infection left ankle was injected anteromedially with steroid lidocaine mixture.  Patient tolerated the injection well and postinjection instructions were reviewed    Will plan to see him back in 6 to 8 weeks but if not continue to improve 3 weeks prior to appointment to let us know so we can get a CT scan of his left ankle prior to follow-up.    Medium Joint Arthrocentesis: L ankle  Date/Time: 7/28/2025 9:22 AM  Consent given by: patient  Site  marked: site marked  Timeout: Immediately prior to procedure a time out was called to verify the correct patient, procedure, equipment, support staff and site/side marked as required   Supporting Documentation  Indications: pain   Procedure Details  Location: ankle - L ankle  Preparation: Patient was prepped and draped in the usual sterile fashion  Needle size: 22 G  Approach: anteromedial  Medications administered: 80 mg methylPREDNISolone acetate 80 MG/ML; 3 mL lidocaine PF 1% 1 %  Patient tolerance: patient tolerated the procedure well with no immediate complications

## 2025-07-28 ENCOUNTER — OFFICE VISIT (OUTPATIENT)
Dept: ORTHOPEDIC SURGERY | Facility: CLINIC | Age: 45
End: 2025-07-28
Payer: COMMERCIAL

## 2025-07-28 VITALS — BODY MASS INDEX: 29.75 KG/M2 | WEIGHT: 207.8 LBS | TEMPERATURE: 97.8 F | HEIGHT: 70 IN

## 2025-07-28 DIAGNOSIS — M76.822 POSTERIOR TIBIAL TENDONITIS, LEFT: ICD-10-CM

## 2025-07-28 DIAGNOSIS — M95.8 OSTEOCHONDRAL DEFECT OF TALUS: Primary | ICD-10-CM

## 2025-07-28 PROCEDURE — 99214 OFFICE O/P EST MOD 30 MIN: CPT | Performed by: ORTHOPAEDIC SURGERY

## 2025-07-28 PROCEDURE — 20605 DRAIN/INJ JOINT/BURSA W/O US: CPT | Performed by: ORTHOPAEDIC SURGERY

## 2025-07-28 RX ADMIN — METHYLPREDNISOLONE ACETATE 80 MG: 80 INJECTION, SUSPENSION INTRA-ARTICULAR; INTRALESIONAL; INTRAMUSCULAR; SOFT TISSUE at 09:22

## 2025-07-28 RX ADMIN — LIDOCAINE HYDROCHLORIDE 3 ML: 10 INJECTION, SOLUTION EPIDURAL; INFILTRATION; INTRACAUDAL; PERINEURAL at 09:22

## 2025-07-29 RX ORDER — LIDOCAINE HYDROCHLORIDE 10 MG/ML
3 INJECTION, SOLUTION EPIDURAL; INFILTRATION; INTRACAUDAL; PERINEURAL
Status: COMPLETED | OUTPATIENT
Start: 2025-07-28 | End: 2025-07-28

## 2025-07-29 RX ORDER — METHYLPREDNISOLONE ACETATE 80 MG/ML
80 INJECTION, SUSPENSION INTRA-ARTICULAR; INTRALESIONAL; INTRAMUSCULAR; SOFT TISSUE
Status: COMPLETED | OUTPATIENT
Start: 2025-07-28 | End: 2025-07-28

## (undated) DEVICE — SINGLE-USE BIOPSY FORCEPS: Brand: RADIAL JAW 4

## (undated) DEVICE — TUBING, SUCTION, 1/4" X 10', STRAIGHT: Brand: MEDLINE

## (undated) DEVICE — SENSR O2 OXIMAX FNGR A/ 18IN NONSTR

## (undated) DEVICE — ADAPT CLN SCPE ENDO PORPOISE BX/50 DISP

## (undated) DEVICE — THE SINGLE USE ETRAP – POLYP TRAP IS USED FOR SUCTION RETRIEVAL OF ENDOSCOPICALLY REMOVED POLYPS.: Brand: ETRAP

## (undated) DEVICE — BLCK/BITE BLOX W/DENTL/RIM W/STRAP 54F

## (undated) DEVICE — GOWN,SIRUS,NONRNF,3XL,18/CS: Brand: MEDLINE

## (undated) DEVICE — VIAL FORMLN CAP 10PCT 20ML

## (undated) DEVICE — ADAPT CLN BIOGUARD AIR/H2O DISP

## (undated) DEVICE — Device

## (undated) DEVICE — CANN O2 ETCO2 FITS ALL CONN CO2 SMPL A/ 7IN DISP LF

## (undated) DEVICE — SNAR POLYP CAPTIVATOR RND STFF 2.4 240CM 10MM 1P/U

## (undated) DEVICE — KT ORCA ORCAPOD DISP STRL